# Patient Record
Sex: FEMALE | Race: OTHER | Employment: UNEMPLOYED | URBAN - METROPOLITAN AREA
[De-identification: names, ages, dates, MRNs, and addresses within clinical notes are randomized per-mention and may not be internally consistent; named-entity substitution may affect disease eponyms.]

---

## 2021-03-08 ENCOUNTER — TRANSCRIBE ORDER (OUTPATIENT)
Dept: SCHEDULING | Age: 21
End: 2021-03-08

## 2021-03-08 DIAGNOSIS — O12.00 EDEMA DURING PREGNANCY: Primary | ICD-10-CM

## 2021-03-09 ENCOUNTER — HOSPITAL ENCOUNTER (OUTPATIENT)
Dept: VASCULAR SURGERY | Age: 21
Discharge: HOME | End: 2021-03-09
Attending: STUDENT IN AN ORGANIZED HEALTH CARE EDUCATION/TRAINING PROGRAM
Payer: MEDICAID

## 2021-03-09 ENCOUNTER — HOSPITAL ENCOUNTER (OUTPATIENT)
Dept: LAB | Age: 21
Discharge: HOME | End: 2021-03-09

## 2021-03-09 DIAGNOSIS — O12.00 EDEMA DURING PREGNANCY: ICD-10-CM

## 2021-03-09 PROCEDURE — 93970 EXTREMITY STUDY: CPT

## 2021-05-26 ENCOUNTER — HOSPITAL ENCOUNTER (INPATIENT)
Age: 21
LOS: 3 days | Discharge: HOME | DRG: 560 | End: 2021-05-29
Attending: OBSTETRICS & GYNECOLOGY | Admitting: OBSTETRICS & GYNECOLOGY
Payer: MEDICAID

## 2021-05-26 PROBLEM — Z34.90 PREGNANCY: Status: ACTIVE | Noted: 2021-05-26

## 2021-05-26 LAB
ABO + RH BLD: NORMAL
APTT PPP: 26.1 SEC (ref 23–36.4)
BASOPHILS # BLD: 0 K/UL (ref 0–0.1)
BASOPHILS NFR BLD: 0 % (ref 0–2)
BLOOD GROUP ANTIBODIES SERPL: NORMAL
DIFFERENTIAL METHOD BLD: ABNORMAL
EOSINOPHIL # BLD: 0 K/UL (ref 0–0.4)
EOSINOPHIL NFR BLD: 0 % (ref 0–5)
ERYTHROCYTE [DISTWIDTH] IN BLOOD BY AUTOMATED COUNT: 13.8 % (ref 11.6–14.5)
HCT VFR BLD AUTO: 30.9 % (ref 35–45)
HGB BLD-MCNC: 9.7 G/DL (ref 12–16)
INR PPP: 1 (ref 0.8–1.2)
LYMPHOCYTES # BLD: 1.7 K/UL (ref 0.9–3.6)
LYMPHOCYTES NFR BLD: 24 % (ref 21–52)
MCH RBC QN AUTO: 27.8 PG (ref 24–34)
MCHC RBC AUTO-ENTMCNC: 31.4 G/DL (ref 31–37)
MCV RBC AUTO: 88.5 FL (ref 74–97)
MONOCYTES # BLD: 0.5 K/UL (ref 0.05–1.2)
MONOCYTES NFR BLD: 7 % (ref 3–10)
NEUTS SEG # BLD: 4.8 K/UL (ref 1.8–8)
NEUTS SEG NFR BLD: 68 % (ref 40–73)
PLATELET # BLD AUTO: 309 K/UL (ref 135–420)
PMV BLD AUTO: 10.9 FL (ref 9.2–11.8)
PROTHROMBIN TIME: 12.8 SEC (ref 11.5–15.2)
RBC # BLD AUTO: 3.49 M/UL (ref 4.2–5.3)
SPECIMEN EXP DATE BLD: NORMAL
WBC # BLD AUTO: 7.1 K/UL (ref 4.6–13.2)

## 2021-05-26 PROCEDURE — 65270000029 HC RM PRIVATE

## 2021-05-26 PROCEDURE — 74011250636 HC RX REV CODE- 250/636: Performed by: OBSTETRICS & GYNECOLOGY

## 2021-05-26 PROCEDURE — 85730 THROMBOPLASTIN TIME PARTIAL: CPT

## 2021-05-26 PROCEDURE — 36415 COLL VENOUS BLD VENIPUNCTURE: CPT

## 2021-05-26 PROCEDURE — 74011000250 HC RX REV CODE- 250: Performed by: OBSTETRICS & GYNECOLOGY

## 2021-05-26 PROCEDURE — 85025 COMPLETE CBC W/AUTO DIFF WBC: CPT

## 2021-05-26 PROCEDURE — 86901 BLOOD TYPING SEROLOGIC RH(D): CPT

## 2021-05-26 PROCEDURE — 85610 PROTHROMBIN TIME: CPT

## 2021-05-26 RX ORDER — OXYTOCIN/RINGER'S LACTATE 30/500 ML
10 PLASTIC BAG, INJECTION (ML) INTRAVENOUS AS NEEDED
Status: COMPLETED | OUTPATIENT
Start: 2021-05-26 | End: 2021-05-27

## 2021-05-26 RX ORDER — HYDROMORPHONE HYDROCHLORIDE 1 MG/ML
1 INJECTION, SOLUTION INTRAMUSCULAR; INTRAVENOUS; SUBCUTANEOUS
Status: DISCONTINUED | OUTPATIENT
Start: 2021-05-26 | End: 2021-05-27 | Stop reason: HOSPADM

## 2021-05-26 RX ORDER — MISOPROSTOL 100 UG/1
25 TABLET ORAL EVERY 4 HOURS
Status: DISCONTINUED | OUTPATIENT
Start: 2021-05-27 | End: 2021-05-26

## 2021-05-26 RX ORDER — NALBUPHINE HYDROCHLORIDE 10 MG/ML
10 INJECTION, SOLUTION INTRAMUSCULAR; INTRAVENOUS; SUBCUTANEOUS
Status: DISCONTINUED | OUTPATIENT
Start: 2021-05-26 | End: 2021-05-27 | Stop reason: HOSPADM

## 2021-05-26 RX ORDER — BUTORPHANOL TARTRATE 2 MG/ML
2 INJECTION INTRAMUSCULAR; INTRAVENOUS
Status: DISCONTINUED | OUTPATIENT
Start: 2021-05-26 | End: 2021-05-27 | Stop reason: HOSPADM

## 2021-05-26 RX ORDER — MISOPROSTOL 100 UG/1
25 TABLET ORAL EVERY 4 HOURS
Status: DISCONTINUED | OUTPATIENT
Start: 2021-05-26 | End: 2021-05-27

## 2021-05-26 RX ORDER — MINERAL OIL
30 OIL (ML) ORAL AS NEEDED
Status: COMPLETED | OUTPATIENT
Start: 2021-05-26 | End: 2021-05-27

## 2021-05-26 RX ORDER — TERBUTALINE SULFATE 1 MG/ML
0.25 INJECTION SUBCUTANEOUS
Status: DISCONTINUED | OUTPATIENT
Start: 2021-05-26 | End: 2021-05-27 | Stop reason: HOSPADM

## 2021-05-26 RX ORDER — LIDOCAINE HYDROCHLORIDE 10 MG/ML
20 INJECTION, SOLUTION EPIDURAL; INFILTRATION; INTRACAUDAL; PERINEURAL AS NEEDED
Status: DISCONTINUED | OUTPATIENT
Start: 2021-05-26 | End: 2021-05-27 | Stop reason: HOSPADM

## 2021-05-26 RX ORDER — METHYLERGONOVINE MALEATE 0.2 MG/ML
0.2 INJECTION INTRAVENOUS AS NEEDED
Status: COMPLETED | OUTPATIENT
Start: 2021-05-26 | End: 2021-05-27

## 2021-05-26 RX ORDER — SODIUM CHLORIDE, SODIUM LACTATE, POTASSIUM CHLORIDE, CALCIUM CHLORIDE 600; 310; 30; 20 MG/100ML; MG/100ML; MG/100ML; MG/100ML
125 INJECTION, SOLUTION INTRAVENOUS CONTINUOUS
Status: DISCONTINUED | OUTPATIENT
Start: 2021-05-26 | End: 2021-05-27 | Stop reason: HOSPADM

## 2021-05-26 RX ORDER — OXYTOCIN/0.9 % SODIUM CHLORIDE 30/500 ML
87.3 PLASTIC BAG, INJECTION (ML) INTRAVENOUS AS NEEDED
Status: COMPLETED | OUTPATIENT
Start: 2021-05-26 | End: 2021-05-27

## 2021-05-26 RX ORDER — ONDANSETRON 2 MG/ML
4 INJECTION INTRAMUSCULAR; INTRAVENOUS
Status: DISCONTINUED | OUTPATIENT
Start: 2021-05-26 | End: 2021-05-27 | Stop reason: HOSPADM

## 2021-05-26 RX ADMIN — MISOPROSTOL 25 MCG: 100 TABLET ORAL at 22:40

## 2021-05-26 RX ADMIN — SODIUM CHLORIDE, SODIUM LACTATE, POTASSIUM CHLORIDE, AND CALCIUM CHLORIDE 125 ML/HR: 600; 310; 30; 20 INJECTION, SOLUTION INTRAVENOUS at 21:21

## 2021-05-27 ENCOUNTER — ANESTHESIA (OUTPATIENT)
Dept: LABOR AND DELIVERY | Age: 21
DRG: 560 | End: 2021-05-27
Payer: MEDICAID

## 2021-05-27 ENCOUNTER — ANESTHESIA EVENT (OUTPATIENT)
Dept: LABOR AND DELIVERY | Age: 21
DRG: 560 | End: 2021-05-27
Payer: MEDICAID

## 2021-05-27 PROBLEM — Z3A.40 40 WEEKS GESTATION OF PREGNANCY: Status: ACTIVE | Noted: 2021-05-27

## 2021-05-27 PROBLEM — O41.03X1 OLIGOHYDRAMNIOS ANTEPARTUM, THIRD TRIMESTER, FETUS 1: Status: ACTIVE | Noted: 2021-05-27

## 2021-05-27 LAB
ALBUMIN SERPL-MCNC: 2.3 G/DL (ref 3.4–5)
ALBUMIN/GLOB SERPL: 0.7 {RATIO} (ref 0.8–1.7)
ALP SERPL-CCNC: 125 U/L (ref 45–117)
ALT SERPL-CCNC: 8 U/L (ref 13–56)
ANION GAP SERPL CALC-SCNC: 8 MMOL/L (ref 3–18)
AST SERPL-CCNC: 16 U/L (ref 10–38)
BILIRUB SERPL-MCNC: 0.4 MG/DL (ref 0.2–1)
BUN SERPL-MCNC: 6 MG/DL (ref 7–18)
BUN/CREAT SERPL: 12 (ref 12–20)
CALCIUM SERPL-MCNC: 8.3 MG/DL (ref 8.5–10.1)
CHLORIDE SERPL-SCNC: 108 MMOL/L (ref 100–111)
CO2 SERPL-SCNC: 22 MMOL/L (ref 21–32)
CREAT SERPL-MCNC: 0.52 MG/DL (ref 0.6–1.3)
GLOBULIN SER CALC-MCNC: 3.2 G/DL (ref 2–4)
GLUCOSE SERPL-MCNC: 89 MG/DL (ref 74–99)
POTASSIUM SERPL-SCNC: 4 MMOL/L (ref 3.5–5.5)
PROT SERPL-MCNC: 5.5 G/DL (ref 6.4–8.2)
SODIUM SERPL-SCNC: 138 MMOL/L (ref 136–145)

## 2021-05-27 PROCEDURE — 77030007879 HC KT SPN EPDRL TELE -B: Performed by: ANESTHESIOLOGY

## 2021-05-27 PROCEDURE — 77030010848 HC CATH INTUTR PRSS KOLB -B

## 2021-05-27 PROCEDURE — 77030019905 HC CATH URETH INTMIT MDII -A

## 2021-05-27 PROCEDURE — 74011250636 HC RX REV CODE- 250/636: Performed by: OBSTETRICS & GYNECOLOGY

## 2021-05-27 PROCEDURE — 74011000258 HC RX REV CODE- 258

## 2021-05-27 PROCEDURE — 74011250637 HC RX REV CODE- 250/637: Performed by: OBSTETRICS & GYNECOLOGY

## 2021-05-27 PROCEDURE — 74011000250 HC RX REV CODE- 250: Performed by: OBSTETRICS & GYNECOLOGY

## 2021-05-27 PROCEDURE — 36415 COLL VENOUS BLD VENIPUNCTURE: CPT

## 2021-05-27 PROCEDURE — 74011000250 HC RX REV CODE- 250: Performed by: ANESTHESIOLOGY

## 2021-05-27 PROCEDURE — 10H07YZ INSERTION OF OTHER DEVICE INTO PRODUCTS OF CONCEPTION, VIA NATURAL OR ARTIFICIAL OPENING: ICD-10-PCS | Performed by: OBSTETRICS & GYNECOLOGY

## 2021-05-27 PROCEDURE — 3E033VJ INTRODUCTION OF OTHER HORMONE INTO PERIPHERAL VEIN, PERCUTANEOUS APPROACH: ICD-10-PCS | Performed by: OBSTETRICS & GYNECOLOGY

## 2021-05-27 PROCEDURE — 77010026065 HC OXYGEN MINIMUM MEDICAL AIR

## 2021-05-27 PROCEDURE — 75410000002 HC LABOR FEE PER 1 HR

## 2021-05-27 PROCEDURE — 74011250636 HC RX REV CODE- 250/636

## 2021-05-27 PROCEDURE — 75410000000 HC DELIVERY VAGINAL/SINGLE

## 2021-05-27 PROCEDURE — 76060000078 HC EPIDURAL ANESTHESIA

## 2021-05-27 PROCEDURE — 65270000029 HC RM PRIVATE

## 2021-05-27 PROCEDURE — 74011250636 HC RX REV CODE- 250/636: Performed by: ANESTHESIOLOGY

## 2021-05-27 PROCEDURE — 0HQ9XZZ REPAIR PERINEUM SKIN, EXTERNAL APPROACH: ICD-10-PCS | Performed by: OBSTETRICS & GYNECOLOGY

## 2021-05-27 PROCEDURE — 80053 COMPREHEN METABOLIC PANEL: CPT

## 2021-05-27 PROCEDURE — 75410000003 HC RECOV DEL/VAG/CSECN EA 0.5 HR

## 2021-05-27 PROCEDURE — 00HU33Z INSERTION OF INFUSION DEVICE INTO SPINAL CANAL, PERCUTANEOUS APPROACH: ICD-10-PCS | Performed by: ANESTHESIOLOGY

## 2021-05-27 RX ORDER — FENTANYL CITRATE 50 UG/ML
INJECTION, SOLUTION INTRAMUSCULAR; INTRAVENOUS
Status: COMPLETED
Start: 2021-05-27 | End: 2021-05-27

## 2021-05-27 RX ORDER — SODIUM CHLORIDE 0.9 % (FLUSH) 0.9 %
5-40 SYRINGE (ML) INJECTION AS NEEDED
Status: DISCONTINUED | OUTPATIENT
Start: 2021-05-27 | End: 2021-05-27 | Stop reason: HOSPADM

## 2021-05-27 RX ORDER — ENOXAPARIN SODIUM 100 MG/ML
80 INJECTION SUBCUTANEOUS EVERY 12 HOURS
Status: DISCONTINUED | OUTPATIENT
Start: 2021-05-28 | End: 2021-05-29 | Stop reason: HOSPADM

## 2021-05-27 RX ORDER — OXYTOCIN/0.9 % SODIUM CHLORIDE 30/500 ML
0-20 PLASTIC BAG, INJECTION (ML) INTRAVENOUS
Status: DISCONTINUED | OUTPATIENT
Start: 2021-05-27 | End: 2021-05-29 | Stop reason: HOSPADM

## 2021-05-27 RX ORDER — LIDOCAINE HYDROCHLORIDE AND EPINEPHRINE 15; 5 MG/ML; UG/ML
INJECTION, SOLUTION EPIDURAL
Status: SHIPPED | OUTPATIENT
Start: 2021-05-27 | End: 2021-05-27

## 2021-05-27 RX ORDER — DIPHENHYDRAMINE HYDROCHLORIDE 50 MG/ML
25 INJECTION, SOLUTION INTRAMUSCULAR; INTRAVENOUS
Status: DISCONTINUED | OUTPATIENT
Start: 2021-05-27 | End: 2021-05-27 | Stop reason: HOSPADM

## 2021-05-27 RX ORDER — ROPIVACAINE HYDROCHLORIDE 2 MG/ML
INJECTION, SOLUTION EPIDURAL; INFILTRATION; PERINEURAL
Status: SHIPPED | OUTPATIENT
Start: 2021-05-27 | End: 2021-05-27

## 2021-05-27 RX ORDER — FENTANYL CITRATE 50 UG/ML
100 INJECTION, SOLUTION INTRAMUSCULAR; INTRAVENOUS ONCE
Status: DISCONTINUED | OUTPATIENT
Start: 2021-05-27 | End: 2021-05-27 | Stop reason: HOSPADM

## 2021-05-27 RX ORDER — NALOXONE HYDROCHLORIDE 0.4 MG/ML
0.2 INJECTION, SOLUTION INTRAMUSCULAR; INTRAVENOUS; SUBCUTANEOUS AS NEEDED
Status: DISCONTINUED | OUTPATIENT
Start: 2021-05-27 | End: 2021-05-27 | Stop reason: HOSPADM

## 2021-05-27 RX ORDER — PHENYLEPHRINE HCL IN 0.9% NACL 1 MG/10 ML
80 SYRINGE (ML) INTRAVENOUS AS NEEDED
Status: DISCONTINUED | OUTPATIENT
Start: 2021-05-27 | End: 2021-05-27 | Stop reason: HOSPADM

## 2021-05-27 RX ORDER — FENTANYL/ROPIVACAINE/NS/PF 2MCG/ML-.1
PLASTIC BAG, INJECTION (ML) EPIDURAL
Status: COMPLETED
Start: 2021-05-27 | End: 2021-05-27

## 2021-05-27 RX ORDER — SODIUM CHLORIDE 0.9 % (FLUSH) 0.9 %
5-40 SYRINGE (ML) INJECTION EVERY 8 HOURS
Status: DISCONTINUED | OUTPATIENT
Start: 2021-05-27 | End: 2021-05-27 | Stop reason: HOSPADM

## 2021-05-27 RX ORDER — FENTANYL/ROPIVACAINE/NS/PF 2MCG/ML-.1
1-15 PLASTIC BAG, INJECTION (ML) EPIDURAL
Status: DISCONTINUED | OUTPATIENT
Start: 2021-05-27 | End: 2021-05-27 | Stop reason: HOSPADM

## 2021-05-27 RX ORDER — FENTANYL CITRATE 50 UG/ML
INJECTION, SOLUTION INTRAMUSCULAR; INTRAVENOUS AS NEEDED
Status: DISCONTINUED | OUTPATIENT
Start: 2021-05-27 | End: 2021-05-27 | Stop reason: HOSPADM

## 2021-05-27 RX ADMIN — SODIUM CHLORIDE, SODIUM LACTATE, POTASSIUM CHLORIDE, AND CALCIUM CHLORIDE 500 ML: 600; 310; 30; 20 INJECTION, SOLUTION INTRAVENOUS at 08:02

## 2021-05-27 RX ADMIN — BUTORPHANOL TARTRATE 2 MG: 2 INJECTION, SOLUTION INTRAMUSCULAR; INTRAVENOUS at 09:16

## 2021-05-27 RX ADMIN — SODIUM CHLORIDE, SODIUM LACTATE, POTASSIUM CHLORIDE, AND CALCIUM CHLORIDE 125 ML/HR: 600; 310; 30; 20 INJECTION, SOLUTION INTRAVENOUS at 11:37

## 2021-05-27 RX ADMIN — FENTANYL CITRATE 100 MCG: 50 INJECTION, SOLUTION INTRAMUSCULAR; INTRAVENOUS at 15:51

## 2021-05-27 RX ADMIN — SODIUM CHLORIDE, SODIUM LACTATE, POTASSIUM CHLORIDE, AND CALCIUM CHLORIDE 1000 ML: 600; 310; 30; 20 INJECTION, SOLUTION INTRAVENOUS at 14:45

## 2021-05-27 RX ADMIN — ROPIVACAINE HYDROCHLORIDE 20 MG: 2 INJECTION EPIDURAL; INFILTRATION; PERINEURAL at 15:46

## 2021-05-27 RX ADMIN — Medication 12 ML/HR: at 19:10

## 2021-05-27 RX ADMIN — METHYLERGONOVINE MALEATE 0.2 MG: 0.2 INJECTION, SOLUTION INTRAMUSCULAR; INTRAVENOUS at 19:56

## 2021-05-27 RX ADMIN — ONDANSETRON 4 MG: 2 INJECTION INTRAMUSCULAR; INTRAVENOUS at 15:18

## 2021-05-27 RX ADMIN — MINERAL OIL 30 ML: 1000 SOLUTION ORAL at 19:20

## 2021-05-27 RX ADMIN — MISOPROSTOL 25 MCG: 100 TABLET ORAL at 04:58

## 2021-05-27 RX ADMIN — FENTANYL CITRATE 12 ML/HR: 0.05 INJECTION, SOLUTION INTRAMUSCULAR; INTRAVENOUS at 15:54

## 2021-05-27 RX ADMIN — Medication 12 ML/HR: at 15:54

## 2021-05-27 RX ADMIN — BUTORPHANOL TARTRATE 2 MG: 2 INJECTION, SOLUTION INTRAMUSCULAR; INTRAVENOUS at 06:46

## 2021-05-27 RX ADMIN — SODIUM CHLORIDE, SODIUM LACTATE, POTASSIUM CHLORIDE, AND CALCIUM CHLORIDE 125 ML/HR: 600; 310; 30; 20 INJECTION, SOLUTION INTRAVENOUS at 15:35

## 2021-05-27 RX ADMIN — Medication 6 MILLI-UNITS/MIN: at 08:34

## 2021-05-27 RX ADMIN — LIDOCAINE HYDROCHLORIDE,EPINEPHRINE BITARTRATE 5 ML: 15; .005 INJECTION, SOLUTION EPIDURAL; INFILTRATION; INTRACAUDAL; PERINEURAL at 15:46

## 2021-05-27 RX ADMIN — ONDANSETRON 4 MG: 2 INJECTION INTRAMUSCULAR; INTRAVENOUS at 07:26

## 2021-05-27 RX ADMIN — Medication 87.3 MILLI-UNITS/MIN: at 19:56

## 2021-05-27 RX ADMIN — Medication 10000 MILLI-UNITS: at 19:36

## 2021-05-27 NOTE — L&D DELIVERY NOTE
Vaginal Delivery Procedure Note    Name: Allyson Casillas   Medical Record Number: [id number]   YOB: 2000  Today's Date: May 27, 2021        Procedure: VAGINAL DELIVERY without suction or forceps       Anesthesia:  epidural    Extra Procedure Details:  Delay L shoulder under symphysis. Resolved with posterior shoulder (R) rotated to anterior and delivered. Delay < 30 seconds     Estimated Blood Loss: 400cc  Fetal Description: matute male     Anterior shoulder: left, right roatated from posterior to anterior and delivered    Umbilical Cord: 3 vessels present    Episiotomy: no   Tear: yesType: R labial and L labial , both superficial.  R lateral and superficial  Degree: 1st degree   Repair:  2/0cc, 1 suture to each labia and 1 suture to the lateral tear              Cord Blood Results:   Information for the patient's :  Gilda Quigley [[id number]]   @ABG@       Birth Information:   Information for the patient's :  Gilda Quigley [[id number]]           Apgars 7,8 at 1 and 5 min respectively    Blood gases sent ph 7.2, art.  7.3 antonio. Specimens: Placenta was not sent     Placenta:    Spontaneous with assist and apparently intact on exam          Complications:  Baby retractions post del, Rod called. Birth Weight: 7lb 2oz    Mother's Condition: good  Baby's Condition: good  Sponge and needle count:    correct    I was present for the delivery.  Delivered for  Our  practice  Signed: Olga Lidia Rosado MD      May 27, 2021

## 2021-05-27 NOTE — ANESTHESIA PREPROCEDURE EVALUATION
Relevant Problems   No relevant active problems       Anesthetic History   No history of anesthetic complications            Review of Systems / Medical History  Patient summary reviewed, nursing notes reviewed and pertinent labs reviewed    Pulmonary  Within defined limits                 Neuro/Psych         Headaches     Cardiovascular                    Comments: DVT- last took lovenox 3 days ago   GI/Hepatic/Renal  Within defined limits              Endo/Other        Anemia     Other Findings            Physical Exam    Airway  Mallampati: II  TM Distance: 4 - 6 cm  Neck ROM: normal range of motion   Mouth opening: Normal     Cardiovascular               Dental  No notable dental hx       Pulmonary                 Abdominal         Other Findings            Anesthetic Plan    ASA: 2  Anesthesia type: epidural            Anesthetic plan and risks discussed with: Patient      Discussed risks of epidural including but not limited to: infection, bleeding, nerve injury, hypotension, post dural puncture headache, back pain, and block failure. Patient wishes to proceed.

## 2021-05-27 NOTE — PROGRESS NOTES
Care plan note: Patient progressing in labor. Afebrile. Reports comfort with epidural. No reported nausea.        Problem: Pain  Goal: *Control of Pain  Outcome: Progressing Towards Goal     Problem: Patient Education: Go to Patient Education Activity  Goal: Patient/Family Education  Outcome: Progressing Towards Goal     Problem: Patient Education: Go to Patient Education Activity  Goal: Patient/Family Education  Outcome: Progressing Towards Goal     Problem: Vaginal Delivery: Day of Deliver-Laboring  Goal: Off Pathway (Use only if patient is Off Pathway)  Outcome: Progressing Towards Goal  Goal: Activity/Safety  Outcome: Progressing Towards Goal  Goal: Consults, if ordered  Outcome: Progressing Towards Goal  Goal: Diagnostic Test/Procedures  Outcome: Progressing Towards Goal  Goal: Nutrition/Diet  Outcome: Progressing Towards Goal  Goal: Discharge Planning  Outcome: Progressing Towards Goal  Goal: Medications  Outcome: Progressing Towards Goal  Goal: Respiratory  Outcome: Progressing Towards Goal  Goal: Treatments/Interventions/Procedures  Outcome: Progressing Towards Goal  Goal: *Vital signs within defined limits  Outcome: Progressing Towards Goal  Goal: *Labs within defined limits  Outcome: Progressing Towards Goal  Goal: *Hemodynamically stable  Outcome: Progressing Towards Goal  Goal: *Optimal pain control at patient's stated goal  Outcome: Progressing Towards Goal

## 2021-05-27 NOTE — PROGRESS NOTES
2100: Pt is a 23 y.o.  at 40w0d, arrived to L&D triagefor scheduled induction for oligo. EFM and TOCO applied, call bell within reach. SBAR to MD Junius Hammans, orders received.     5: SBAR update to MD Shaver, aware of SROM and SVE

## 2021-05-27 NOTE — PROGRESS NOTES
Slow to increase pit because of worry re tachysystole. I think these conractions are mild to moderate. Placing IUPC may enable better and safer use of pit. Discussed with patient and she is in agreement. Monitor:  Reactivity:present Variability:present Baseline:within normal limits  Contractions q 4    Vitals:  Blood pressure (!) 117/53, pulse 62, temperature 97.7 °F (36.5 °C), resp. rate 13, height 5' 4\" (1.626 m), weight 79.8 kg (176 lb), SpO2 99 %.      Pelvic exam:  Cervix 3   Effaced: 80%Station: 0        Plan:     IUPC, continue pit      Signed By: Temo Chavarria MD                         May 27, 2021

## 2021-05-27 NOTE — H&P
Ostetrical History and Physical    Subjective:     Date of Admission: 2021    Patient is a 23 y.o.   female admitted with term preg for induction because of oligo. Started with cytotec, SROM at 1am.  Cytotec has had no obvious effect. At 8am start pit    For Obstetric history, see prenantal.    For Review of Systems, see prenatal    Past Medical History:   Diagnosis Date    Anemia     DVT (deep vein thrombosis) in pregnancy     Migraine       History reviewed. No pertinent surgical history. Prior to Admission medications    Medication Sig Start Date End Date Taking? Authorizing Provider   PNV Comb #2-Iron-FA-Omega 3 29-1-400 mg cmpk Take 1 Tablet by mouth. Yes Provider, Historical   heparin sodium,porcine (HEPARIN, PORCINE,) 10,000 Units by Does Not Apply route two (2) times a day. Yes Provider, Historical     No Known Allergies   Social History     Tobacco Use    Smoking status: Never Smoker    Smokeless tobacco: Never Used   Substance Use Topics    Alcohol use: Not Currently      History reviewed. No pertinent family history. Objective:     Blood pressure 110/70, pulse 70, temperature 98.1 °F (36.7 °C), resp. rate 18, height 5' 4\" (1.626 m), weight 79.8 kg (176 lb). Temp (24hrs), Av.1 °F (36.7 °C), Min:98 °F (36.7 °C), Max:98.1 °F (36.7 °C)        No intake/output data recorded. No intake/output data recorded. HEENT: No pallor, no jaundice, Thyroid and throat normal  RESPIRATORY: Clear to A & P  CVS: pulse reg, HS normal  ABDOMEN: Gravid. Vertex. EFW=7lb +-1lb. No abnormal tenderness.    Pelvic: Cervix 1, (by RN) ROM confirmed by RN   Data Review:   Recent Results (from the past 24 hour(s))   CBC WITH AUTOMATED DIFF    Collection Time: 21  9:20 PM   Result Value Ref Range    WBC 7.1 4.6 - 13.2 K/uL    RBC 3.49 (L) 4.20 - 5.30 M/uL    HGB 9.7 (L) 12.0 - 16.0 g/dL    HCT 30.9 (L) 35.0 - 45.0 %    MCV 88.5 74.0 - 97.0 FL    MCH 27.8 24.0 - 34.0 PG    MCHC 31.4 31.0 - 37.0 g/dL    RDW 13.8 11.6 - 14.5 %    PLATELET 388 241 - 074 K/uL    MPV 10.9 9.2 - 11.8 FL    NEUTROPHILS 68 40 - 73 %    LYMPHOCYTES 24 21 - 52 %    MONOCYTES 7 3 - 10 %    EOSINOPHILS 0 0 - 5 %    BASOPHILS 0 0 - 2 %    ABS. NEUTROPHILS 4.8 1.8 - 8.0 K/UL    ABS. LYMPHOCYTES 1.7 0.9 - 3.6 K/UL    ABS. MONOCYTES 0.5 0.05 - 1.2 K/UL    ABS. EOSINOPHILS 0.0 0.0 - 0.4 K/UL    ABS. BASOPHILS 0.0 0.0 - 0.1 K/UL    DF AUTOMATED     TYPE & SCREEN    Collection Time: 05/26/21  9:20 PM   Result Value Ref Range    Crossmatch Expiration 05/29/2021,235     ABO/Rh(D) A POSITIVE     Antibody screen NEG    PTT    Collection Time: 05/26/21  9:20 PM   Result Value Ref Range    aPTT 26.1 23.0 - 36.4 SEC   PROTHROMBIN TIME + INR    Collection Time: 05/26/21  9:20 PM   Result Value Ref Range    Prothrombin time 12.8 11.5 - 15.2 sec    INR 1.0 0.8 - 1.2       Monitor:  Reactivity:present Variability:present Baseline:within normal limits    Assessment:     Active Problems:    Pregnancy (5/26/2021)      40 weeks gestation of pregnancy (5/27/2021)      Oligohydramnios antepartum, third trimester, fetus 1 (5/27/2021)        Plan:     Cytotec has had no obvious effect. At 8am start pit    Check labs:  CBC  Check  Prenatal:    Disposition:     Type of admit:In-Patient    I saw and examined patient.     Signed By: Paolo Michael MD                         May 27, 2021

## 2021-05-27 NOTE — PROGRESS NOTES
Problem: Pain  Goal: *Control of Pain  Outcome: Progressing Towards Goal     Problem: Patient Education: Go to Patient Education Activity  Goal: Patient/Family Education  Outcome: Progressing Towards Goal     Problem: Patient Education: Go to Patient Education Activity  Goal: Patient/Family Education  Outcome: Progressing Towards Goal     Problem: Vaginal Delivery: Day of Deliver-Laboring  Goal: Off Pathway (Use only if patient is Off Pathway)  Outcome: Progressing Towards Goal  Goal: Activity/Safety  Outcome: Progressing Towards Goal  Goal: Consults, if ordered  Outcome: Progressing Towards Goal  Goal: Diagnostic Test/Procedures  Outcome: Progressing Towards Goal  Goal: Nutrition/Diet  Outcome: Progressing Towards Goal  Goal: Discharge Planning  Outcome: Progressing Towards Goal  Goal: Medications  Outcome: Progressing Towards Goal  Goal: Respiratory  Outcome: Progressing Towards Goal  Goal: Treatments/Interventions/Procedures  Outcome: Progressing Towards Goal  Goal: *Vital signs within defined limits  Outcome: Progressing Towards Goal  Goal: *Labs within defined limits  Outcome: Progressing Towards Goal  Goal: *Hemodynamically stable  Outcome: Progressing Towards Goal  Goal: *Optimal pain control at patient's stated goal  Outcome: Progressing Towards Goal     Problem: Pain  Goal: *Control of Pain  Outcome: Progressing Towards Goal     Problem: Vaginal Delivery: Day of Deliver-Laboring  Goal: Off Pathway (Use only if patient is Off Pathway)  Outcome: Progressing Towards Goal  Goal: Activity/Safety  Outcome: Progressing Towards Goal  Goal: Consults, if ordered  Outcome: Progressing Towards Goal  Goal: Diagnostic Test/Procedures  Outcome: Progressing Towards Goal  Goal: Nutrition/Diet  Outcome: Progressing Towards Goal  Goal: Discharge Planning  Outcome: Progressing Towards Goal  Goal: Medications  Outcome: Progressing Towards Goal  Goal: Respiratory  Outcome: Progressing Towards Goal  Goal: Treatments/Interventions/Procedures  Outcome: Progressing Towards Goal  Goal: *Vital signs within defined limits  Outcome: Progressing Towards Goal  Goal: *Labs within defined limits  Outcome: Progressing Towards Goal  Goal: *Hemodynamically stable  Outcome: Progressing Towards Goal  Goal: *Optimal pain control at patient's stated goal  Outcome: Progressing Towards Goal

## 2021-05-27 NOTE — ANESTHESIA PROCEDURE NOTES
Epidural Block    Start time: 5/27/2021 3:46 PM  End time: 5/27/2021 3:51 PM  Reason for block: labor epidural  Staffing  Performed: attending   Anesthesiologist: Sarita Garcia MD  Preanesthetic Checklist  Completed: patient identified, IV checked, site marked, risks and benefits discussed, surgical consent, monitors and equipment checked, pre-op evaluation and timeout performed  Block Placement  Patient position: sitting  Prep: ChloraPrep  Sterility prep: mask, hand, gloves, drape and cap  Sedation level: no sedation  Patient monitoring: frequent blood pressure checks, heart rate and continuous pulse oximetry  Approach: midline  Location: lumbar  Lumbar location: L3-L4  Epidural  Loss of resistance technique: saline  Guidance: landmark technique  Needle  Needle type: Tuohy   Needle gauge: 19 G  Needle length: 9 cm  Needle insertion depth: 5 cm  Catheter type: multi-orifice  Catheter size: 19 G  Catheter at skin depth: 10 cm  Catheter securement method: clear occlusive dressing and surgical tape  Test dose: negative  Medications Administered  Lidocaine-EPINEPHrine (XYLOCAINE) 1.5 %-1:200,000 Epidural, 5 mL  ropivacaine (NAROPIN) 2 mg/mL (0.2 %) injection, 20 mg  Assessment  Block outcome: pain improved  Number of attempts: 1  Procedure assessment: patient tolerated procedure well with no immediate complications

## 2021-05-27 NOTE — PROGRESS NOTES
Intermittent decelsalays with good recovery    Monitor:  Reactivity:present Variability:present Baseline:within normal limits  Contractions q 3    Vitals:  Blood pressure (!) 96/50, pulse 66, temperature 98.1 °F (36.7 °C), resp. rate 20, height 5' 4\" (1.626 m), weight 79.8 kg (176 lb), SpO2 100 %.      Pelvic exam:  Cervix 7   Effaced: 90%Station: 0   caput at +1    Plan:     Continue pit, reduce at  present      Signed By: Jacky Blakely MD                         May 27, 2021

## 2021-05-28 LAB
HCT VFR BLD AUTO: 28.8 % (ref 35–45)
HGB BLD-MCNC: 9 G/DL (ref 12–16)

## 2021-05-28 PROCEDURE — 36415 COLL VENOUS BLD VENIPUNCTURE: CPT

## 2021-05-28 PROCEDURE — 74011250636 HC RX REV CODE- 250/636: Performed by: OBSTETRICS & GYNECOLOGY

## 2021-05-28 PROCEDURE — 65270000029 HC RM PRIVATE

## 2021-05-28 PROCEDURE — 85018 HEMOGLOBIN: CPT

## 2021-05-28 PROCEDURE — 74011250637 HC RX REV CODE- 250/637: Performed by: OBSTETRICS & GYNECOLOGY

## 2021-05-28 RX ORDER — ACETAMINOPHEN 325 MG/1
650 TABLET ORAL
Status: DISCONTINUED | OUTPATIENT
Start: 2021-05-28 | End: 2021-05-29 | Stop reason: HOSPADM

## 2021-05-28 RX ORDER — PROMETHAZINE HYDROCHLORIDE 25 MG/ML
25 INJECTION, SOLUTION INTRAMUSCULAR; INTRAVENOUS
Status: DISCONTINUED | OUTPATIENT
Start: 2021-05-28 | End: 2021-05-29 | Stop reason: HOSPADM

## 2021-05-28 RX ORDER — AMOXICILLIN 250 MG
1 CAPSULE ORAL
Status: DISCONTINUED | OUTPATIENT
Start: 2021-05-28 | End: 2021-05-29 | Stop reason: HOSPADM

## 2021-05-28 RX ORDER — ZOLPIDEM TARTRATE 5 MG/1
5 TABLET ORAL
Status: DISCONTINUED | OUTPATIENT
Start: 2021-05-28 | End: 2021-05-29 | Stop reason: HOSPADM

## 2021-05-28 RX ORDER — IBUPROFEN 400 MG/1
800 TABLET ORAL
Status: DISCONTINUED | OUTPATIENT
Start: 2021-05-28 | End: 2021-05-29 | Stop reason: HOSPADM

## 2021-05-28 RX ORDER — OXYCODONE AND ACETAMINOPHEN 5; 325 MG/1; MG/1
2 TABLET ORAL
Status: DISCONTINUED | OUTPATIENT
Start: 2021-05-28 | End: 2021-05-29 | Stop reason: HOSPADM

## 2021-05-28 RX ADMIN — ENOXAPARIN SODIUM 80 MG: 80 INJECTION SUBCUTANEOUS at 02:02

## 2021-05-28 RX ADMIN — IBUPROFEN 800 MG: 400 TABLET, FILM COATED ORAL at 22:50

## 2021-05-28 RX ADMIN — IBUPROFEN 800 MG: 400 TABLET, FILM COATED ORAL at 14:27

## 2021-05-28 RX ADMIN — ACETAMINOPHEN 650 MG: 325 TABLET ORAL at 12:36

## 2021-05-28 RX ADMIN — ENOXAPARIN SODIUM 80 MG: 80 INJECTION SUBCUTANEOUS at 14:12

## 2021-05-28 RX ADMIN — IBUPROFEN 800 MG: 400 TABLET, FILM COATED ORAL at 06:27

## 2021-05-28 NOTE — ROUTINE PROCESS
191 Verbal bedside report received, assumed care of patient awake and alert. EFM/IUPC in progress. Epidural anesthesia in progress with patient reporting no pain, only pressure during contractions. IV infusing without difficulty.  Legs up in stirrups to begin pushing. Dr. Shella Barthel at bedside for delivery.    Per Dr. Shella Barthel of viable male with spontaneous cry placed on moms chest for skin to skin.  Spontaneous delivery of intact placenta per Dr. Shella Barthel. Pitocin infusion initiated at bolus rate.  Laceration repair complete per MD. All counts correct, begin recovery.  Epidural catheter removed with blue tip intact. Up to BR with minimal assistance. Voided 600 ml without difficulty. Pericare done per patient.  Transferred per W/C to NICU to visit with infant.  TRANSFER - OUT REPORT:    Verbal report given to LAZARA Haynes RN(name) on [employer]  being transferred to mother baby(unit) for routine progression of care       Report consisted of patients Situation, Background, Assessment and   Recommendations(SBAR). Information from the following report(s) SBAR, Kardex, Procedure Summary, Intake/Output, MAR, Recent Results and Med Rec Status was reviewed with the receiving nurse. Lines:   Peripheral IV 21 Posterior;Right Forearm (Active)        Opportunity for questions and clarification was provided.       Patient transported with:   Registered Nurse

## 2021-05-28 NOTE — PROGRESS NOTES
2218 TRANSFER - IN REPORT:    Verbal report received from MARIELOS Baker(name) on [employer]  being received from L&D(unit) for routine progression of care      Report consisted of patients Situation, Background, Assessment and   Recommendations(SBAR). Information from the following report(s) SBAR, Kardex, Intake/Output, MAR and Recent Results was reviewed with the receiving nurse. Opportunity for questions and clarification was provided. 2255 shift assessment    0720 Bedside and Verbal shift change report given to NY Garnett RN (oncoming nurse) by Tomasa Zuniga RN (offgoing nurse). Report included the following information SBAR, Kardex, Procedure Summary, Intake/Output and Recent Results.

## 2021-05-28 NOTE — PROGRESS NOTES
0720 Bedside and Verbal shift change report given to NY Roberts RN (oncoming nurse) by Arash Heredia RN (offgoing nurse). Report included the following information SBAR, Kardex, Procedure Summary, Intake/Output, MAR and Recent Results. 0825 Shift assessment complete. Patient has no needs or concerns at this time    36 [employer] HUGs tag to be changed, patient has no needs or concerns at this time    1046 informed pt  would like to be seen by MELISSA, pt verbalized understanding. 1115 Patient informed of  status, going to nurse. 1236 2 tab tylenol given to patient per request    1325 Pain reassessed. 1348 Patent ambulating to nursery for  bath    1412 Lovenox given    1416 Ambulating to room    1427 2 tab motrin given    1510 Reassessment complete and pain reassessed    1625 Patient has no needs or concerns at this time    1808 Patient has no needs or concerns at this time    1910 Bedside and Verbal shift change report given to MICAH Vidal RN (oncoming nurse) by Barbara Arnold. Vinny Roberts RN (offgoing nurse). Report included the following information SBAR, Kardex, Procedure Summary, Intake/Output, MAR and Recent Results.

## 2021-05-28 NOTE — LACTATION NOTE
0 Mom educated on breastfeeding basics--hunger cues, feeding on demand, waking baby if baby sleeps too long between feeds, importance of skin to skin, positioning and latching, risk of pacifier use and supplemental feedings, and importance of rooming in--and use of log sheet. Mom also educated on benefits of breastfeeding for herself and baby. Mom verbalized understanding. No questions at this time. 2350 Attempted to get  latched. Grants very spitty and sleepy at this time. Mom was able to express several drops of colostrum into newborns mouth. Encouraged to keep  skin to skin until feeding. Mom verbalized understanding.

## 2021-05-28 NOTE — ANESTHESIA POSTPROCEDURE EVALUATION
5/28/2021  7:52 PM    Laboring Epidural Follow-up Note     Referring physician: Prasanna North DO   Patient status post vaginal delivery with labor epidural    Visit Vitals  BP (!) 106/58 (BP 1 Location: Left arm, BP Patient Position: At rest)   Pulse 67   Temp 36.9 °C (98.5 °F)   Resp 17   Ht 5' 4\" (1.626 m)   Wt 79.8 kg (176 lb)   SpO2 98%   Breastfeeding Unknown   BMI 30.21 kg/m²       Epidural removed by L&D staff  No sedation, pruritis noted. Adequate analgesia. No obvious anesthesia complications          Sara A May-Such, CRNA  * No procedures listed *.    epidural    <BSHSIANPOST>    INITIAL Post-op Vital signs: No vitals data found for the desired time range.

## 2021-05-28 NOTE — PROGRESS NOTES
Progress Note    Patient: Hong Robison MRN: [id number]  SSN: [id number]    YOB: 2000  Age: 23 y.o. Sex: female      Subjective:     Postpartum Day: 1     Delivery: vaginal delivery    The patient feels well. The patient denies emotional concerns. The baby iswell. Baby is feeding via breast.  The patient is ambulating well. The patient  tolerating a normal diet. Flatus has been passed. Objective:      No data found. LABS: Recent Results (from the past 24 hour(s))   METABOLIC PANEL, COMPREHENSIVE    Collection Time: 05/27/21 10:43 PM   Result Value Ref Range    Sodium 138 136 - 145 mmol/L    Potassium 4.0 3.5 - 5.5 mmol/L    Chloride 108 100 - 111 mmol/L    CO2 22 21 - 32 mmol/L    Anion gap 8 3.0 - 18 mmol/L    Glucose 89 74 - 99 mg/dL    BUN 6 (L) 7.0 - 18 MG/DL    Creatinine 0.52 (L) 0.6 - 1.3 MG/DL    BUN/Creatinine ratio 12 12 - 20      GFR est AA >60 >60 ml/min/1.73m2    GFR est non-AA >60 >60 ml/min/1.73m2    Calcium 8.3 (L) 8.5 - 10.1 MG/DL    Bilirubin, total 0.4 0.2 - 1.0 MG/DL    ALT (SGPT) 8 (L) 13 - 56 U/L    AST (SGOT) 16 10 - 38 U/L    Alk. phosphatase 125 (H) 45 - 117 U/L    Protein, total 5.5 (L) 6.4 - 8.2 g/dL    Albumin 2.3 (L) 3.4 - 5.0 g/dL    Globulin 3.2 2.0 - 4.0 g/dL    A-G Ratio 0.7 (L) 0.8 - 1.7     HGB & HCT    Collection Time: 05/28/21  6:04 AM   Result Value Ref Range    HGB 9.0 (L) 12.0 - 16.0 g/dL    HCT 28.8 (L) 35.0 - 45.0 %          Lochia:  appropriate   Uterine Fundus:   firm   Fundus Location:  -1   Incision:  no significant drainage   DVT Evaluation:  No evidence of DVT seen on physical exam.     Lab/Data Review: All lab results for the last 24 hours reviewed. Assessment:     Status post: Doing well postpartum vaginal delivery     Plan:     Postpartum care discussed including diet, ambulation, and actvitiy restrictions. Discharge instructions and questions answered for vaginal delivery.     Signed By: Akila Moore MD     May 28, 2021

## 2021-05-28 NOTE — PROGRESS NOTES
Problem: Pain  Goal: *Control of Pain  Outcome: Progressing Towards Goal     Problem: Patient Education: Go to Patient Education Activity  Goal: Patient/Family Education  Outcome: Progressing Towards Goal     Problem: Patient Education: Go to Patient Education Activity  Goal: Patient/Family Education  Outcome: Progressing Towards Goal     Problem: Vaginal Delivery: Postpartum Day 1  Goal: Activity/Safety  Outcome: Progressing Towards Goal  Goal: Nutrition/Diet  Outcome: Progressing Towards Goal  Goal: Discharge Planning  Outcome: Progressing Towards Goal  Goal: Medications  Outcome: Progressing Towards Goal  Goal: Psychosocial  Outcome: Progressing Towards Goal  Goal: *Vital signs within defined limits  Outcome: Progressing Towards Goal  Goal: *Labs within defined limits  Outcome: Progressing Towards Goal  Goal: *Hemodynamically stable  Outcome: Progressing Towards Goal  Goal: *Optimal pain control at patient's stated goal  Outcome: Progressing Towards Goal  Goal: *Participates in infant care  Outcome: Progressing Towards Goal  Goal: *Demonstrates progressive activity  Outcome: Progressing Towards Goal  Goal: *Performs self perineal care  Outcome: Progressing Towards Goal  Goal: *Appropriate parent-infant bonding  Outcome: Progressing Towards Goal  Goal: *Tolerating diet  Outcome: Progressing Towards Goal  Goal: *Performs self breast care  Outcome: Progressing Towards Goal

## 2021-05-28 NOTE — LACTATION NOTE
This note was copied from a baby's chart. 80 per mom, infant latching and nursing well. No questions at this time. Will remain available.

## 2021-05-28 NOTE — LACTATION NOTE
Infant latched and nursing well. DOL expectations discussed. 0 Mom concerned about infant nursing often and sucking on hands. Discussed nutritive vs non nutritive and cluster feeds. Discussed laxative properties of colostrum as well. Mom verbalized understanding and no questions at this time.

## 2021-05-29 VITALS
DIASTOLIC BLOOD PRESSURE: 69 MMHG | SYSTOLIC BLOOD PRESSURE: 114 MMHG | BODY MASS INDEX: 30.05 KG/M2 | HEART RATE: 56 BPM | TEMPERATURE: 97.7 F | OXYGEN SATURATION: 98 % | RESPIRATION RATE: 16 BRPM | HEIGHT: 64 IN | WEIGHT: 176 LBS

## 2021-05-29 PROCEDURE — 74011250637 HC RX REV CODE- 250/637: Performed by: OBSTETRICS & GYNECOLOGY

## 2021-05-29 PROCEDURE — 74011250636 HC RX REV CODE- 250/636: Performed by: OBSTETRICS & GYNECOLOGY

## 2021-05-29 RX ADMIN — ENOXAPARIN SODIUM 80 MG: 80 INJECTION SUBCUTANEOUS at 14:32

## 2021-05-29 RX ADMIN — ENOXAPARIN SODIUM 80 MG: 80 INJECTION SUBCUTANEOUS at 03:26

## 2021-05-29 RX ADMIN — IBUPROFEN 800 MG: 400 TABLET, FILM COATED ORAL at 08:06

## 2021-05-29 NOTE — ROUTINE PROCESS
Bedside and Verbal shift change report given to GLENDY Nagel RN (oncoming nurse) by MICAH Gipson RN (offgoing nurse). Report included the following information SBAR, Kardex, Intake/Output, MAR and Recent Results.

## 2021-05-29 NOTE — PROGRESS NOTES
Problem: Vaginal Delivery: Postpartum 2  Goal: Activity/Safety  Outcome: Progressing Towards Goal  Goal: Nutrition/Diet  Outcome: Progressing Towards Goal  Goal: Discharge Planning  Outcome: Progressing Towards Goal  Goal: Psychosocial  Outcome: Progressing Towards Goal     Problem: Pain  Goal: *Control of Pain  Outcome: Progressing Towards Goal

## 2021-05-29 NOTE — PROGRESS NOTES
Progress Note    Patient: Cristian Lezama MRN: [id number]  SSN: [id number]    YOB: 2000  Age: 23 y.o. Sex: female      Subjective:     Postpartum Day: 2     Delivery: vaginal delivery    The patient feels well. The patient denies emotional concerns. The baby iswell. Baby is feeding via breast.  The patient is ambulating well. The patient  tolerating a normal diet. Flatus has been passed. Objective:      Patient Vitals for the past 8 hrs:   BP Temp Pulse Resp SpO2   05/29/21 0805 114/69 97.7 °F (36.5 °C) (!) 56 16 98 %     LABS: No results found for this or any previous visit (from the past 24 hour(s)). Lochia:  appropriate   Uterine Fundus:   firm   Fundus Location:  -1   Incision:  no significant drainage   DVT Evaluation:  No evidence of DVT seen on physical exam.     Lab/Data Review: All lab results for the last 24 hours reviewed. Assessment:     Status post: Doing well postpartum vaginal delivery     Plan:     Postpartum care discussed including diet, ambulation, and actvitiy restrictions. Discharge instructions and questions answered for vaginal delivery.     Signed By: Kim Herrera MD     May 29, 2021

## 2021-05-29 NOTE — DISCHARGE INSTRUCTIONS
POST DELIVERY DISCHARGE INSTRUCTIONS    Name: Julia Martinez  YOB: 2000  Primary Diagnosis: Active Problems:    Pregnancy (5/26/2021)      40 weeks gestation of pregnancy (5/27/2021)      Oligohydramnios antepartum, third trimester, fetus 1 (5/27/2021)      General:     Diet/Diet Restrictions:  Eight 8-ounce glasses of fluid daily (water, juices); avoid excessive caffeine intake. Meals/snacks as desired which are high in fiber and carbohydrates and low in fat and cholesterol. Physical Activity / Restrictions / Safety:     Avoid heavy lifting, no more that 8 lbs. For 2-3 weeks; limit use of stairs to 2 times daily for the first week home. No driving for one week. Avoid intercourse 4-6 weeks, no douching or tampon use. Check with obstetrician before starting or resuming an exercise program.       Discharge Instructions/Special Treatment/Home Care Needs:     Continue prenatal vitamins. Continue to use squirt bottle with warm water on your episiotomy after each bathroom use until bleeding stops. Call your doctor for the following:     Fever over 101 degrees by mouth. Vaginal bleeding heavier than a normal menstrual period or clot larger than a golf ball. Red streaks or increased swelling of legs, painful red streaks on your breast.  Painful urination, constipation and increased pain or swelling   If you feel extremely anxious or overwhelmed. If you have thoughts of harming yourself and/or your baby. Pain Management:     Pain Management:   Take Acetaminophen (Tylenol) or Ibuprofen (Advil, Motrin), as directed for pain. Use a warm Sitz bath 3 times daily to relieve episiotomy or hemorrhoidal discomfort. For hemorrhoidal discomfort, use Tucks and Anusol cream as needed and directed. Follow-Up Care:      These are general instructions for a healthy lifestyle:    No smoking/ No tobacco products/ Avoid exposure to second hand smoke    Surgeon General's Warning:  Quitting smoking now greatly reduces serious risk to your health. Obesity, smoking, and sedentary lifestyle greatly increases your risk for illness    A healthy diet, regular physical exercise & weight monitoring are important for maintaining a healthy lifestyle    Recognize signs and symptoms of STROKE:    F-face looks uneven    A-arms unable to move or move unevenly    S-speech slurred or non-existent    T-time-call 911 as soon as signs and symptoms begin-DO NOT go       Back to bed or wait to see if you get better-TIME IS BRAIN.

## 2021-05-29 NOTE — PROGRESS NOTES
0720 Bedside and Verbal shift change report given to MARGE June (oncoming nurse) by GLENDY Nagel RN (offgoing nurse). Report included the following information SBAR, Kardex, Procedure Summary, Intake/Output, MAR, Accordion, Recent Results and Med Rec Status. 0730 pt awake; no needs verbalized when asked    0805 VS/assessment done; see flowsheet. Motrin given for jamil discomfort    0955 pt sleeping; no distress    1130 pt sitting in bed; pumping; no needs verbalized    1215 pt sitting in bed; explained circ care of infant upon return to room; ID bands verified. 1345 No c//o verbalized when asked    026 848 14 90 no needs verbalized when asked; pt getting ready to go home. Discussed timing of discharge w/ pt    1515 I have reviewed discharge instructions with the patient. The patient verbalized understanding. AVS given and reviewed    1530 Patient armband removed and shredded. ID bands verified on pt and infant ; all info correct. 65 Pt in wheelchair; escorted to front door for discharge to home. No distress at this time.

## 2021-05-29 NOTE — PROGRESS NOTES
Bedside and Verbal shift change report given to MARGE Field (oncoming nurse) by GLENDY Nagel RN (offgoing nurse). Report included the following information SBAR, Kardex, Procedure Summary, Intake/Output, MAR, Accordion, Recent Results and Med Rec Status.

## 2021-05-29 NOTE — PROGRESS NOTES
Problem: Pain  Goal: *Control of Pain  Outcome: Progressing Towards Goal     Problem: Vaginal Delivery: Day of Deliver-Laboring  Goal: Medications  Outcome: Progressing Towards Goal  Goal: *Vital signs within defined limits  Outcome: Progressing Towards Goal  Goal: *Labs within defined limits  Outcome: Progressing Towards Goal  Goal: *Optimal pain control at patient's stated goal  Outcome: Progressing Towards Goal

## 2022-08-27 ENCOUNTER — ANESTHESIA (OUTPATIENT)
Dept: LABOR AND DELIVERY | Age: 22
DRG: 560 | End: 2022-08-27
Payer: MEDICAID

## 2022-08-27 ENCOUNTER — HOSPITAL ENCOUNTER (INPATIENT)
Age: 22
LOS: 1 days | Discharge: HOME | DRG: 560 | End: 2022-08-28
Attending: OBSTETRICS & GYNECOLOGY | Admitting: OBSTETRICS & GYNECOLOGY
Payer: MEDICAID

## 2022-08-27 ENCOUNTER — ANESTHESIA EVENT (OUTPATIENT)
Dept: LABOR AND DELIVERY | Age: 22
DRG: 560 | End: 2022-08-27
Payer: MEDICAID

## 2022-08-27 PROBLEM — Z33.1 IUP (INTRAUTERINE PREGNANCY), INCIDENTAL: Status: ACTIVE | Noted: 2022-08-27

## 2022-08-27 LAB
ABO + RH BLD: NORMAL
BASOPHILS # BLD: 0 K/UL (ref 0–0.1)
BASOPHILS NFR BLD: 0 % (ref 0–2)
BLOOD GROUP ANTIBODIES SERPL: NORMAL
DIFFERENTIAL METHOD BLD: ABNORMAL
EOSINOPHIL # BLD: 0 K/UL (ref 0–0.4)
EOSINOPHIL NFR BLD: 0 % (ref 0–5)
ERYTHROCYTE [DISTWIDTH] IN BLOOD BY AUTOMATED COUNT: 14.1 % (ref 11.6–14.5)
HCT VFR BLD AUTO: 30 % (ref 35–45)
HGB BLD-MCNC: 9.6 G/DL (ref 12–16)
IMM GRANULOCYTES # BLD AUTO: 0 K/UL (ref 0–0.04)
IMM GRANULOCYTES NFR BLD AUTO: 0 % (ref 0–0.5)
LYMPHOCYTES # BLD: 1.2 K/UL (ref 0.9–3.6)
LYMPHOCYTES NFR BLD: 13 % (ref 21–52)
MCH RBC QN AUTO: 27 PG (ref 24–34)
MCHC RBC AUTO-ENTMCNC: 32 G/DL (ref 31–37)
MCV RBC AUTO: 84.5 FL (ref 78–100)
MONOCYTES # BLD: 0.5 K/UL (ref 0.05–1.2)
MONOCYTES NFR BLD: 5 % (ref 3–10)
NEUTS SEG # BLD: 7.9 K/UL (ref 1.8–8)
NEUTS SEG NFR BLD: 82 % (ref 40–73)
NRBC # BLD: 0 K/UL (ref 0–0.01)
NRBC BLD-RTO: 0 PER 100 WBC
PLATELET # BLD AUTO: 246 K/UL (ref 135–420)
PMV BLD AUTO: 11 FL (ref 9.2–11.8)
RBC # BLD AUTO: 3.55 M/UL (ref 4.2–5.3)
SPECIMEN EXP DATE BLD: NORMAL
WBC # BLD AUTO: 9.7 K/UL (ref 4.6–13.2)

## 2022-08-27 PROCEDURE — 10H073Z INSERTION OF MONITORING ELECTRODE INTO PRODUCTS OF CONCEPTION, VIA NATURAL OR ARTIFICIAL OPENING: ICD-10-PCS | Performed by: OBSTETRICS & GYNECOLOGY

## 2022-08-27 PROCEDURE — 86900 BLOOD TYPING SEROLOGIC ABO: CPT

## 2022-08-27 PROCEDURE — 10907ZC DRAINAGE OF AMNIOTIC FLUID, THERAPEUTIC FROM PRODUCTS OF CONCEPTION, VIA NATURAL OR ARTIFICIAL OPENING: ICD-10-PCS | Performed by: OBSTETRICS & GYNECOLOGY

## 2022-08-27 PROCEDURE — 75410000002 HC LABOR FEE PER 1 HR

## 2022-08-27 PROCEDURE — 65270000029 HC RM PRIVATE

## 2022-08-27 PROCEDURE — 74011250636 HC RX REV CODE- 250/636

## 2022-08-27 PROCEDURE — 74011250636 HC RX REV CODE- 250/636: Performed by: NURSE ANESTHETIST, CERTIFIED REGISTERED

## 2022-08-27 PROCEDURE — 77030019905 HC CATH URETH INTMIT MDII -A

## 2022-08-27 PROCEDURE — 85025 COMPLETE CBC W/AUTO DIFF WBC: CPT

## 2022-08-27 PROCEDURE — 74011000250 HC RX REV CODE- 250: Performed by: NURSE ANESTHETIST, CERTIFIED REGISTERED

## 2022-08-27 PROCEDURE — 74011250637 HC RX REV CODE- 250/637: Performed by: ADVANCED PRACTICE MIDWIFE

## 2022-08-27 PROCEDURE — 00HU33Z INSERTION OF INFUSION DEVICE INTO SPINAL CANAL, PERCUTANEOUS APPROACH: ICD-10-PCS | Performed by: OBSTETRICS & GYNECOLOGY

## 2022-08-27 PROCEDURE — 74011000258 HC RX REV CODE- 258: Performed by: OBSTETRICS & GYNECOLOGY

## 2022-08-27 PROCEDURE — 75410000000 HC DELIVERY VAGINAL/SINGLE

## 2022-08-27 PROCEDURE — 74011250636 HC RX REV CODE- 250/636: Performed by: ADVANCED PRACTICE MIDWIFE

## 2022-08-27 PROCEDURE — 76060000078 HC EPIDURAL ANESTHESIA

## 2022-08-27 PROCEDURE — 77030007879 HC KT SPN EPDRL TELE -B: Performed by: NURSE ANESTHETIST, CERTIFIED REGISTERED

## 2022-08-27 PROCEDURE — 75410000003 HC RECOV DEL/VAG/CSECN EA 0.5 HR

## 2022-08-27 PROCEDURE — 77030040830 HC CATH URETH FOL MDII -A

## 2022-08-27 PROCEDURE — 74011000258 HC RX REV CODE- 258

## 2022-08-27 PROCEDURE — 74011250636 HC RX REV CODE- 250/636: Performed by: OBSTETRICS & GYNECOLOGY

## 2022-08-27 RX ORDER — NALBUPHINE HYDROCHLORIDE 10 MG/ML
2.5 INJECTION, SOLUTION INTRAMUSCULAR; INTRAVENOUS; SUBCUTANEOUS
Status: DISCONTINUED | OUTPATIENT
Start: 2022-08-27 | End: 2022-08-27 | Stop reason: HOSPADM

## 2022-08-27 RX ORDER — PHENYLEPHRINE HCL IN 0.9% NACL 1 MG/10 ML
80 SYRINGE (ML) INTRAVENOUS AS NEEDED
Status: COMPLETED | OUTPATIENT
Start: 2022-08-27 | End: 2022-08-27

## 2022-08-27 RX ORDER — SODIUM CHLORIDE, SODIUM LACTATE, POTASSIUM CHLORIDE, CALCIUM CHLORIDE 600; 310; 30; 20 MG/100ML; MG/100ML; MG/100ML; MG/100ML
125 INJECTION, SOLUTION INTRAVENOUS CONTINUOUS
Status: DISCONTINUED | OUTPATIENT
Start: 2022-08-27 | End: 2022-08-27 | Stop reason: HOSPADM

## 2022-08-27 RX ORDER — ROPIVACAINE IN 0.9% SOD CHL/PF 0.2 %
PLASTIC BAG, INJECTION (ML) EPIDURAL AS NEEDED
Status: DISCONTINUED | OUTPATIENT
Start: 2022-08-27 | End: 2022-08-27 | Stop reason: HOSPADM

## 2022-08-27 RX ORDER — SODIUM CHLORIDE 0.9 % (FLUSH) 0.9 %
5-40 SYRINGE (ML) INJECTION EVERY 8 HOURS
Status: DISCONTINUED | OUTPATIENT
Start: 2022-08-27 | End: 2022-08-27 | Stop reason: HOSPADM

## 2022-08-27 RX ORDER — ENOXAPARIN SODIUM 100 MG/ML
40 INJECTION SUBCUTANEOUS EVERY 24 HOURS
Status: DISCONTINUED | OUTPATIENT
Start: 2022-08-28 | End: 2022-08-28 | Stop reason: HOSPADM

## 2022-08-27 RX ORDER — LIDOCAINE HYDROCHLORIDE 10 MG/ML
20 INJECTION, SOLUTION EPIDURAL; INFILTRATION; INTRACAUDAL; PERINEURAL AS NEEDED
Status: DISCONTINUED | OUTPATIENT
Start: 2022-08-27 | End: 2022-08-27 | Stop reason: HOSPADM

## 2022-08-27 RX ORDER — BUTORPHANOL TARTRATE 2 MG/ML
2 INJECTION INTRAMUSCULAR; INTRAVENOUS
Status: DISCONTINUED | OUTPATIENT
Start: 2022-08-27 | End: 2022-08-27

## 2022-08-27 RX ORDER — OXYTOCIN/0.9 % SODIUM CHLORIDE 30/500 ML
0-20 PLASTIC BAG, INJECTION (ML) INTRAVENOUS
Status: DISCONTINUED | OUTPATIENT
Start: 2022-08-27 | End: 2022-08-28 | Stop reason: HOSPADM

## 2022-08-27 RX ORDER — TERBUTALINE SULFATE 1 MG/ML
0.25 INJECTION SUBCUTANEOUS
Status: DISCONTINUED | OUTPATIENT
Start: 2022-08-27 | End: 2022-08-27 | Stop reason: HOSPADM

## 2022-08-27 RX ORDER — BUTORPHANOL TARTRATE 2 MG/ML
2 INJECTION INTRAMUSCULAR; INTRAVENOUS
Status: DISCONTINUED | OUTPATIENT
Start: 2022-08-27 | End: 2022-08-27 | Stop reason: HOSPADM

## 2022-08-27 RX ORDER — OXYTOCIN/RINGER'S LACTATE 30/500 ML
10 PLASTIC BAG, INJECTION (ML) INTRAVENOUS AS NEEDED
Status: COMPLETED | OUTPATIENT
Start: 2022-08-27 | End: 2022-08-27

## 2022-08-27 RX ORDER — OXYCODONE AND ACETAMINOPHEN 5; 325 MG/1; MG/1
2 TABLET ORAL
Status: DISCONTINUED | OUTPATIENT
Start: 2022-08-27 | End: 2022-08-28 | Stop reason: HOSPADM

## 2022-08-27 RX ORDER — HYDROMORPHONE HYDROCHLORIDE 1 MG/ML
1 INJECTION, SOLUTION INTRAMUSCULAR; INTRAVENOUS; SUBCUTANEOUS
Status: DISCONTINUED | OUTPATIENT
Start: 2022-08-27 | End: 2022-08-27 | Stop reason: HOSPADM

## 2022-08-27 RX ORDER — EPHEDRINE SULFATE/0.9% NACL/PF 50 MG/5 ML
10 SYRINGE (ML) INTRAVENOUS AS NEEDED
Status: DISCONTINUED | OUTPATIENT
Start: 2022-08-27 | End: 2022-08-27 | Stop reason: HOSPADM

## 2022-08-27 RX ORDER — SODIUM CHLORIDE 0.9 % (FLUSH) 0.9 %
5-40 SYRINGE (ML) INJECTION AS NEEDED
Status: DISCONTINUED | OUTPATIENT
Start: 2022-08-27 | End: 2022-08-27 | Stop reason: HOSPADM

## 2022-08-27 RX ORDER — DIPHENHYDRAMINE HYDROCHLORIDE 50 MG/ML
25 INJECTION, SOLUTION INTRAMUSCULAR; INTRAVENOUS
Status: DISCONTINUED | OUTPATIENT
Start: 2022-08-27 | End: 2022-08-27 | Stop reason: HOSPADM

## 2022-08-27 RX ORDER — ONDANSETRON 2 MG/ML
4 INJECTION INTRAMUSCULAR; INTRAVENOUS
Status: DISCONTINUED | OUTPATIENT
Start: 2022-08-27 | End: 2022-08-27 | Stop reason: HOSPADM

## 2022-08-27 RX ORDER — IBUPROFEN 400 MG/1
800 TABLET ORAL EVERY 8 HOURS
Status: DISCONTINUED | OUTPATIENT
Start: 2022-08-27 | End: 2022-08-28 | Stop reason: HOSPADM

## 2022-08-27 RX ORDER — MINERAL OIL
30 OIL (ML) ORAL AS NEEDED
Status: DISCONTINUED | OUTPATIENT
Start: 2022-08-27 | End: 2022-08-27 | Stop reason: HOSPADM

## 2022-08-27 RX ORDER — METHYLERGONOVINE MALEATE 0.2 MG/ML
0.2 INJECTION INTRAVENOUS AS NEEDED
Status: DISCONTINUED | OUTPATIENT
Start: 2022-08-27 | End: 2022-08-27 | Stop reason: HOSPADM

## 2022-08-27 RX ORDER — PROMETHAZINE HYDROCHLORIDE 25 MG/ML
25 INJECTION, SOLUTION INTRAMUSCULAR; INTRAVENOUS
Status: DISCONTINUED | OUTPATIENT
Start: 2022-08-27 | End: 2022-08-28 | Stop reason: HOSPADM

## 2022-08-27 RX ORDER — NALOXONE HYDROCHLORIDE 0.4 MG/ML
0.2 INJECTION, SOLUTION INTRAMUSCULAR; INTRAVENOUS; SUBCUTANEOUS AS NEEDED
Status: DISCONTINUED | OUTPATIENT
Start: 2022-08-27 | End: 2022-08-27 | Stop reason: HOSPADM

## 2022-08-27 RX ORDER — FENTANYL/ROPIVACAINE/NS/PF 2MCG/ML-.1
PLASTIC BAG, INJECTION (ML) EPIDURAL
Status: DISCONTINUED
Start: 2022-08-27 | End: 2022-08-27 | Stop reason: WASHOUT

## 2022-08-27 RX ORDER — LIDOCAINE HYDROCHLORIDE AND EPINEPHRINE 15; 5 MG/ML; UG/ML
INJECTION, SOLUTION EPIDURAL AS NEEDED
Status: DISCONTINUED | OUTPATIENT
Start: 2022-08-27 | End: 2022-08-27 | Stop reason: HOSPADM

## 2022-08-27 RX ORDER — FENTANYL/ROPIVACAINE/NS/PF 2MCG/ML-.1
1-15 PLASTIC BAG, INJECTION (ML) EPIDURAL
Status: DISCONTINUED | OUTPATIENT
Start: 2022-08-27 | End: 2022-08-27 | Stop reason: HOSPADM

## 2022-08-27 RX ORDER — FENTANYL/ROPIVACAINE/NS/PF 2MCG/ML-.1
PLASTIC BAG, INJECTION (ML) EPIDURAL
Status: COMPLETED
Start: 2022-08-27 | End: 2022-08-27

## 2022-08-27 RX ORDER — LANOLIN ALCOHOL/MO/W.PET/CERES
3 CREAM (GRAM) TOPICAL
Status: DISCONTINUED | OUTPATIENT
Start: 2022-08-27 | End: 2022-08-28 | Stop reason: HOSPADM

## 2022-08-27 RX ORDER — DIPHENHYDRAMINE HYDROCHLORIDE 50 MG/ML
12.5 INJECTION, SOLUTION INTRAMUSCULAR; INTRAVENOUS ONCE
Status: COMPLETED | OUTPATIENT
Start: 2022-08-27 | End: 2022-08-27

## 2022-08-27 RX ORDER — AMOXICILLIN 250 MG
1 CAPSULE ORAL
Status: DISCONTINUED | OUTPATIENT
Start: 2022-08-27 | End: 2022-08-28 | Stop reason: HOSPADM

## 2022-08-27 RX ORDER — NALBUPHINE HYDROCHLORIDE 10 MG/ML
10 INJECTION, SOLUTION INTRAMUSCULAR; INTRAVENOUS; SUBCUTANEOUS
Status: DISCONTINUED | OUTPATIENT
Start: 2022-08-27 | End: 2022-08-27 | Stop reason: HOSPADM

## 2022-08-27 RX ORDER — ACETAMINOPHEN 325 MG/1
650 TABLET ORAL
Status: DISCONTINUED | OUTPATIENT
Start: 2022-08-27 | End: 2022-08-28 | Stop reason: HOSPADM

## 2022-08-27 RX ORDER — OXYTOCIN/0.9 % SODIUM CHLORIDE 30/500 ML
87.3 PLASTIC BAG, INJECTION (ML) INTRAVENOUS AS NEEDED
Status: DISCONTINUED | OUTPATIENT
Start: 2022-08-27 | End: 2022-08-27 | Stop reason: HOSPADM

## 2022-08-27 RX ADMIN — SODIUM CHLORIDE, POTASSIUM CHLORIDE, SODIUM LACTATE AND CALCIUM CHLORIDE 125 ML/HR: 600; 310; 30; 20 INJECTION, SOLUTION INTRAVENOUS at 14:19

## 2022-08-27 RX ADMIN — IBUPROFEN 800 MG: 400 TABLET, FILM COATED ORAL at 18:02

## 2022-08-27 RX ADMIN — Medication 80 MCG: at 15:20

## 2022-08-27 RX ADMIN — Medication 10000 MILLI-UNITS: at 15:27

## 2022-08-27 RX ADMIN — SODIUM CHLORIDE, POTASSIUM CHLORIDE, SODIUM LACTATE AND CALCIUM CHLORIDE 1000 ML: 600; 310; 30; 20 INJECTION, SOLUTION INTRAVENOUS at 08:01

## 2022-08-27 RX ADMIN — Medication 12 ML/HR: at 08:59

## 2022-08-27 RX ADMIN — SODIUM CHLORIDE, POTASSIUM CHLORIDE, SODIUM LACTATE AND CALCIUM CHLORIDE 1000 ML: 600; 310; 30; 20 INJECTION, SOLUTION INTRAVENOUS at 15:22

## 2022-08-27 RX ADMIN — SODIUM CHLORIDE, POTASSIUM CHLORIDE, SODIUM LACTATE AND CALCIUM CHLORIDE 125 ML/HR: 600; 310; 30; 20 INJECTION, SOLUTION INTRAVENOUS at 09:04

## 2022-08-27 RX ADMIN — Medication 5 ML: at 08:58

## 2022-08-27 RX ADMIN — SODIUM CHLORIDE 5 MILLION UNITS: 900 INJECTION INTRAVENOUS at 08:08

## 2022-08-27 RX ADMIN — LIDOCAINE HYDROCHLORIDE,EPINEPHRINE BITARTRATE 5 ML: 15; .005 INJECTION, SOLUTION EPIDURAL; INFILTRATION; INTRACAUDAL; PERINEURAL at 08:52

## 2022-08-27 RX ADMIN — ROPIVACAINE HYDROCHLORIDE 12 ML/HR: 5 INJECTION, SOLUTION EPIDURAL; INFILTRATION; PERINEURAL at 08:59

## 2022-08-27 RX ADMIN — Medication 2 MILLI-UNITS/MIN: at 14:45

## 2022-08-27 RX ADMIN — SODIUM CHLORIDE 2.5 MILLION UNITS: 9 INJECTION, SOLUTION INTRAVENOUS at 12:38

## 2022-08-27 RX ADMIN — DIPHENHYDRAMINE HYDROCHLORIDE 12.5 MG: 50 INJECTION INTRAMUSCULAR; INTRAVENOUS at 13:38

## 2022-08-27 RX ADMIN — SODIUM CHLORIDE, POTASSIUM CHLORIDE, SODIUM LACTATE AND CALCIUM CHLORIDE 125 ML/HR: 600; 310; 30; 20 INJECTION, SOLUTION INTRAVENOUS at 08:07

## 2022-08-27 NOTE — PROGRESS NOTES
6234 Bedside and Verbal shift change report given to NY French (oncoming nurse) by Robb Redman RN (offgoing nurse). Report included the following information SBAR, Kardex, Intake/Output, MAR, Recent Results, and Quality Measures. 615 [facility]. Grayson CRNA. Anesthesia at bedside    0838: inpostion for epidural    0839: epidural TO    0851: epiduralCatheter placed    4232: epidural Test Dose    0858: epidural loading Dose admin    0858: To left tilt    0926 Voided 150 cc on bedpan without difficulty. 5500 [facility] to bedside. SVE and AROM. 1515 Bedside and Verbal shift change report given to GLENDY Gifford RN (oncoming nurse) by Eve Bonner. Rhonda French (offgoing nurse). Report included the following information SBAR, Kardex, Procedure Summary, Intake/Output, MAR, Recent Results, and Quality Measures. CHELSIE Durbin and SEVEN Talavera to bedside for decel.

## 2022-08-27 NOTE — H&P
History & Physical    Name: Lily Patino MRN: [id number]  SSN: [id number]    YOB: 2000  Age: 24 y.o. Sex: female        Subjective:     Estimated Date of Delivery: 22  OB History    Para Term  AB Living   2 1 1     1   SAB IAB Ectopic Molar Multiple Live Births           0 1      # Outcome Date GA Lbr Lawrence/2nd Weight Sex Delivery Anes PTL Lv   2 Current            1 Term 21 40w0d 17:55 / 00:36 3.232 kg Kathrynn Leisure       Ms. Cale Guillen is admitted with pregnancy at 39w1d for contractions. Prenatal course was complicated by  a Hx of DVT in previous pregnancy, she never started her lovenox or heparin, and late to care with irregular PNVisits in 3rd trimester. Please see prenatal records for details. Past Medical History:   Diagnosis Date    Anemia     DVT (deep vein thrombosis) in pregnancy     Migraine      History reviewed. No pertinent surgical history. Social History     Occupational History    Not on file   Tobacco Use    Smoking status: Never    Smokeless tobacco: Never   Vaping Use    Vaping Use: Never used   Substance and Sexual Activity    Alcohol use: Not Currently    Drug use: Not Currently    Sexual activity: Not on file     History reviewed. No pertinent family history. No Known Allergies  Prior to Admission medications    Medication Sig Start Date End Date Taking? Authorizing Provider   PNV Comb #2-Iron-FA-Omega 3 29-1-400 mg cmpk Take 1 Tablet by mouth. Provider, Historical   heparin sodium,porcine (HEPARIN, PORCINE,) 10,000 Units by Does Not Apply route two (2) times a day. Patient not taking: Reported on 2022    Provider, Historical        Review of Systems: A comprehensive review of systems was negative except for that written in the HPI.     Objective:     Vitals:  Vitals:    22 0942 22 0946 22 0947 22 0952   BP:  111/78     Pulse:  75     Resp:       Temp:       SpO2: 100%  100% 100%   Weight:       Height: Physical Exam:  Patient without distress. Heart: Regular rate and rhythm  Lung: normal respiratory effort  Abdomen: soft, nontender  VE: 5-6/90/0, BOWI  Membranes:  Intact  Fetal Heart Rate: Reactive    Prenatal Labs:   Lab Results   Component Value Date/Time    ABO/Rh(D) A POSITIVE 2022 08:04 AM         Assessment/Plan:     Active Problems:    IUP (intrauterine pregnancy), incidental (2022)         Plan: Admit for labor.     Monitor labor progress  Epidural when desired  Pitocin augmentation if needed  PCN for GBS positive  Anticipate     Maureen Gauthier, MSN, APRN, CNM  2022

## 2022-08-27 NOTE — PROGRESS NOTES
Labor Progress Note  Patient seen, fetal heart rate and contraction pattern evaluated, patient examined. No data found. Physical Exam:  Cervical Exam:  5 (5-6)cm dilated    90% effaced    0 station    Presenting Part: cephalic  Membranes:  Artificial Rupture of Membranes;  Amniotic Fluid: small amount of clear fluid  Uterine Activity: Frequency: Every 7-8 minutes, Duration: 80 seconds, and Intensity: moderate  Fetal Heart Rate: Baseline: 130 per minute  Variability: moderate  Accelerations: yes  Decelerations: none  Uterine contractions: regular, every 7-8 minutes    Assessment/Plan:  Reassuring fetal status, Labor  Progressing normally, Continue plan for vaginal delivery

## 2022-08-27 NOTE — ANESTHESIA PROCEDURE NOTES
Epidural Block    Start time: 8/27/2022 8:33 AM  End time: 8/27/2022 8:58 AM  Reason for block: labor epidural  Staffing  Performed: CRNA   Resident/CRNA: Fredy Saucedo CRNA  Preanesthetic Checklist  Completed: patient identified, IV checked, site marked, risks and benefits discussed, surgical consent, monitors and equipment checked, pre-op evaluation, timeout performed and fire risk safety assessment completed and verbalized  Block Placement  Patient position: sitting  Prep: ChloraPrep  Sterility prep: cap, drape, mask and gloves  Sedation level: no sedation  Patient monitoring: frequent blood pressure checks and heart rate  Approach: midline  Epidural  Loss of resistance technique: saline  Guidance: landmark technique and ultrasound guided  Needle  Needle type: Tuohy   Needle gauge: 17 G  Needle length: 9 cm  Needle insertion depth: 4.5 cm  Catheter type: end hole  Catheter size: 19 G  Catheter at skin depth: 10 cm  Catheter securement method: clear occlusive dressing and surgical tape  Test dose: negative  Assessment  Number of attempts: 1  Procedure assessment: patient tolerated procedure well with no immediate complications

## 2022-08-27 NOTE — ANESTHESIA PREPROCEDURE EVALUATION
Relevant Problems   No relevant active problems       Anesthetic History   No history of anesthetic complications            Review of Systems / Medical History  Patient summary reviewed, nursing notes reviewed and pertinent labs reviewed    Pulmonary  Within defined limits                 Neuro/Psych   Within defined limits           Cardiovascular  Within defined limits                Exercise tolerance: >4 METS     GI/Hepatic/Renal  Within defined limits              Endo/Other        Anemia     Other Findings   Comments: H/o dvt           Physical Exam    Airway  Mallampati: III  TM Distance: 4 - 6 cm    Mouth opening: Normal     Cardiovascular  Regular rate and rhythm,  S1 and S2 normal,  no murmur, click, rub, or gallop  Rhythm: regular  Rate: normal         Dental  No notable dental hx       Pulmonary  Breath sounds clear to auscultation               Abdominal  Abdominal exam normal       Other Findings            Anesthetic Plan    ASA: 2  Anesthesia type: epidural            Anesthetic plan and risks discussed with: Patient

## 2022-08-27 NOTE — PROGRESS NOTES
OOB with assistance to BR and back to bed. While in BR, pt was instructed in how to do jamil care-pt performed own jamil care, fresh cold pack, fresh panties placed. Pt tolerated well, no weakness or dizziness noted until pt was getting back into bed and then she was unsteady and was assisted into bed

## 2022-08-27 NOTE — PROGRESS NOTES
1855- TRANSFER - IN REPORT:    Verbal report received from Lex(name) on [place]  being received from L/D(unit) for routine progression of care      Report consisted of patients Situation, Background, Assessment and   Recommendations(SBAR). Information from the following report(s) SBAR, Intake/Output, MAR, and Recent Results was reviewed with the receiving nurse. Opportunity for questions and clarification was provided. Pt breastfeeding at this time    1905- Bedside and Verbal shift change report given to Adal Salmon (oncoming nurse) by Nancy Kidd (offgoing nurse). Report included the following information SBAR, Intake/Output, MAR, and Recent Results.

## 2022-08-27 NOTE — PROGRESS NOTES
8562 Pt arrived to L&D with c/o ctx's q3 minutes. She is a  39w1d. EFM's placed on maternal abdomen. Cervix checked she is 7.7-8/100/0. Pt stated she is group B strep positive. 2215 Verbal report given to NY Peters RN, care assumed at this time.

## 2022-08-27 NOTE — PROGRESS NOTES
Pt c/o intermitt abd cramping at 1750- asked for and received po motrin at 1802 for pain rating of 2, pt's pain goal is 0/no pain.

## 2022-08-27 NOTE — PROGRESS NOTES
From BR 4 to postpartum rm 239 via wheelchair, accompanied by Rema Emerson RN.  Settled into new rm.,

## 2022-08-27 NOTE — L&D DELIVERY NOTE
Delivery Summary    Patient: Ayo Jauregui MRN: [id number]  SSN: [id number]    YOB: 2000  Age: 24 y.o. Sex: female       Information for the patient's :  Bipin Colon [[id number]]     Labor Events:    Labor:      Steroids:     Cervical Ripening Date/Time:       Cervical Ripening Type:     Antibiotics During Labor:     Rupture Identifier:      Rupture Date/Time: 2022 9:34 AM   Rupture Type: AROM   Amniotic Fluid Volume:      Amniotic Fluid Description: Clear    Amniotic Fluid Odor: None    Induction:         Induction Date/Time:        Indications for Induction:      Augmentation:     Augmentation Date/Time:      Indications for Augmentation:     Labor complications: Additional complications:        Delivery Events:  Indications For Episiotomy:     Episiotomy:     Perineal Laceration(s):     Repaired:     Periurethral Laceration Location:      Repaired:     Labial Laceration Location:     Repaired:     Sulcal Laceration Location:     Repaired:     Vaginal Laceration Location:     Repaired:     Cervical Laceration Location:     Repaired:     Repair Suture:     Number of Repair Packets:     Estimated Blood Loss (ml):  ml   Quantitative Blood Loss (ml)                Delivery Date: 2022    Delivery Time: 3:24 PM  Delivery Type: Vaginal, Spontaneous  Sex:  Male    Gestational Age: 36w3d   Delivery Clinician:  Clarke Primrose  Living Status: Living   Delivery Location: L&D LDR4          APGARS  One minute Five minutes Ten minutes   Skin color: 0   1        Heart rate: 2   2        Grimace: 2   2        Muscle tone: 2   2        Breathin   2        Totals: 8   9            Presentation: Vertex    Position: Left Occiput Anterior  Resuscitation Method:  Suctioning-bulb; Tactile Stimulation     Meconium Stained: None      Cord Information: 3 Vessels  Complications: Nuchal Cord With Compressions  Cord around: right lower extremity  Delayed cord clamping? Yes  Cord clamped date/time:2022  3:28 PM  Disposition of Cord Blood: Lab    Blood Gases Sent?: No    Placenta:  Date/Time:    Removal:        Appearance:       Carver Measurements:  Birth Weight: 3.08 kg      Birth Length: 52.1 cm      Head Circumference: 33.5 cm      Chest Circumference: 33 cm     Abdominal Girth: 30.5 cm    Other Providers:   ;KENTRELL FUENTES;ALEJO STROUD;;MARYJO BLEDSOE;;;;DIMAS VERNON;;Nidia GIPSON, Obstetrician;Primary Nurse;Primary  Nurse;Nicu Nurse;Neonatologist;Anesthesiologist;Crna;Nurse Practitioner;Midwife;Nursery Nurse;Midwife;Staff Nurse         Group B Strep: No results found for: GRBSEXT, GRBSEXT  Information for the patient's :  Bipin Colon [[id number]]   No results found for: ABORH, PCTABR, PCTDIG, BILI, ABORHEXT, ABORH   No results for input(s): PCO2CB, PO2CB, HCO3I, SO2I, IBD, PTEMPI, SPECTI, PHICB, ISITE, IDEV, IALLEN in the last 72 hours. Presented in early active labor. After epidural placement and pitocin infusion, infant with occasional variable decel with slow recovery, good variability. Infant with deep, prolonged variable, VE7/swollen anterior lip/+1. FSE placed to verify FHR. Position change did not resolve FHR. VE to reduce cervix and patient was found to be AL/c/+2. Infant delivered spontaneously over intact perineum in the JIMMY position. Cord wrapped around right infant foot, cord very thin and flat. Infant placed skin to skin, lusty cries noted with stimulation. Cord clamping delayed for 3min. Placenta delivered spontaneously. EBL: 200mls. Fundus firm and midline. Infant began grunting about 10min post birth, NICU called to bedside. Infant to NICU to transition. Patient stable.     Rosendo Diaz, MSN, APRN, CNM  2022

## 2022-08-28 VITALS
OXYGEN SATURATION: 98 % | BODY MASS INDEX: 28.85 KG/M2 | HEART RATE: 60 BPM | SYSTOLIC BLOOD PRESSURE: 103 MMHG | TEMPERATURE: 98 F | RESPIRATION RATE: 18 BRPM | HEIGHT: 64 IN | DIASTOLIC BLOOD PRESSURE: 43 MMHG | WEIGHT: 169 LBS

## 2022-08-28 LAB
HCT VFR BLD AUTO: 28.6 % (ref 35–45)
HGB BLD-MCNC: 8.9 G/DL (ref 12–16)

## 2022-08-28 PROCEDURE — 85018 HEMOGLOBIN: CPT

## 2022-08-28 PROCEDURE — 36415 COLL VENOUS BLD VENIPUNCTURE: CPT

## 2022-08-28 PROCEDURE — 74011250637 HC RX REV CODE- 250/637: Performed by: ADVANCED PRACTICE MIDWIFE

## 2022-08-28 PROCEDURE — 74011250636 HC RX REV CODE- 250/636: Performed by: ADVANCED PRACTICE MIDWIFE

## 2022-08-28 RX ORDER — IBUPROFEN 200 MG
800 CAPSULE ORAL
Qty: 360 CAPSULE | Refills: 0 | Status: SHIPPED | OUTPATIENT
Start: 2022-08-28

## 2022-08-28 RX ORDER — LANOLIN ALCOHOL/MO/W.PET/CERES
325 CREAM (GRAM) TOPICAL
Qty: 60 TABLET | Refills: 0 | Status: SHIPPED | OUTPATIENT
Start: 2022-08-28

## 2022-08-28 RX ADMIN — IBUPROFEN 800 MG: 400 TABLET, FILM COATED ORAL at 01:53

## 2022-08-28 RX ADMIN — ENOXAPARIN SODIUM 40 MG: 100 INJECTION SUBCUTANEOUS at 05:14

## 2022-08-28 RX ADMIN — IBUPROFEN 800 MG: 400 TABLET, FILM COATED ORAL at 09:41

## 2022-08-28 NOTE — PROGRESS NOTES
Problem: Pain  Goal: *Control of Pain  Outcome: Progressing Towards Goal     Problem: Vaginal Delivery: Day of Delivery-Post delivery  Goal: Activity/Safety  Outcome: Progressing Towards Goal  Goal: Discharge Planning  Outcome: Progressing Towards Goal  Goal: Medications  Outcome: Progressing Towards Goal  Goal: Treatments/Interventions/Procedures  Outcome: Progressing Towards Goal  Goal: *Vital signs within defined limits  Outcome: Progressing Towards Goal  Goal: *Labs within defined limits  Outcome: Progressing Towards Goal  Goal: *Hemodynamically stable  Outcome: Progressing Towards Goal  Goal: *Optimal pain control at patient's stated goal  Outcome: Progressing Towards Goal  Goal: *Participates in infant care  Outcome: Progressing Towards Goal  Goal: *Demonstrates progressive activity  Outcome: Progressing Towards Goal

## 2022-08-28 NOTE — DISCHARGE SUMMARY
Obstetrical Discharge Summary     Name: Arlin Sosa MRN: [id number]  SSN: [id number]    YOB: 2000  Age: 24 y.o. Sex: female      Allergies: Patient has no known allergies. Admit Date: 2022    Discharge Date: 2022     Admitting Physician: Lisa Mae MD     Attending Physician:  Aakash Hinson MD     * Admission Diagnoses: IUP (intrauterine pregnancy), incidental [Z33.1]    * Discharge Diagnoses:   Information for the patient's :  Suyapa Haines [[id number]]   Delivery of a 3.08 kg male infant via Vaginal, Spontaneous on 2022 at 3:24 PM  by Frederic Ann. Apgars were 8  and 9 . Additional Diagnoses:   Hospital Problems as of 2022 Date Reviewed: 2021            Codes Class Noted - Resolved POA    IUP (intrauterine pregnancy), incidental ICD-10-CM: Z33.1  ICD-9-CM: V22.2  2022 - Present Unknown          Lab Results   Component Value Date/Time    ABO/Rh(D) A POSITIVE 2022 08:04 AM    There is no immunization history for the selected administration types on file for this patient. * Procedures:              * Discharge Condition: good    * Hospital Course: Normal hospital course following the delivery. * Disposition: Home    Discharge Medications:   Current Discharge Medication List        START taking these medications    Details   ibuprofen 200 mg cap Take 800 mg by mouth every eight (8) hours as needed for Pain. Qty: 360 Capsule, Refills: 0  Start date: 2022      ferrous sulfate (Iron) 325 mg (65 mg iron) tablet Take 1 Tablet by mouth Daily (before breakfast). Qty: 60 Tablet, Refills: 0  Start date: 2022           CONTINUE these medications which have NOT CHANGED    Details   PNV Comb #2-Iron-FA-Omega 3 29-1-400 mg cmpk Take 1 Tablet by mouth.            STOP taking these medications       heparin sodium,porcine (HEPARIN, PORCINE,) Comments:   Reason for Stopping:               * Follow-up Care/Patient Instructions:   Activity: Activity as tolerated  Diet: Regular Diet  Wound Care: None needed    Follow-up Information       Follow up With Specialties Details Why Contact Info    Juan Potter MD [facility]   [place] [id number]  [address] [place] [address], MSN, APRN, CNM  August 28, 2022

## 2022-08-28 NOTE — PROGRESS NOTES
0730 Bedside and Verbal shift change report given to Maco Cervantes RN (oncoming nurse) by Geraldo Ch RN (offgoing nurse). Report included the following information SBAR, Kardex, Intake/Output, MAR, and Recent Results. Pt educated on pain management, pt denies any pain or needs at this time. 0940 Assessment completed, scheduled IBU administered, pt denies any pain or needs at this time. 1158 Pt denies any pain or needs at this time. 1443 Reassessment completed, WDL. 1840 AVS and discharge teachings reviewed and e-signed, arm  bands verified and matching, arm bands verified and matching, pt discharged home in stable condition.

## 2022-08-28 NOTE — DISCHARGE INSTRUCTIONS
Postpartum: Care Instructions  Overview  After childbirth (postpartum period), your body goes through many changes. Some of these changes happen over several weeks. In the hours after delivery, your body will begin to recover from childbirth while it prepares to breastfeed your . You may feel emotional during this time. Your hormones can shift your mood without warning for no clear reason. In the first couple of weeks after childbirth, it's common to have emotions that change from happy to sad. You may find it hard to sleep. You may cry a lot. This is called the \"baby blues. \" These overwhelming emotions often go away within a couple of days or weeks. But it's important to discuss your feelings with your doctor. It's easy to get too tired and overwhelmed during the first weeks after childbirth. Don't try to do too much. Get rest whenever you can, accept help from others, and eat well and drink plenty of fluids. In the first couple of weeks after you give birth, your doctor or midwife may want to check in with you and make a plan for any follow-up care you may need. You will likely have a complete postpartum visit in the first 3 months after delivery. At that time, your doctor or midwife will check on your recovery from childbirth and see how you're doing with your emotions. You may also discuss your concerns or questions. Follow-up care is a key part of your treatment and safety. Be sure to make and go to all appointments, and call your doctor if you are having problems. It's also a good idea to know your test results and keep a list of the medicines you take. How can you care for yourself at home? Sleep or rest when your baby sleeps. Get help with household chores from family or friends, if you can. Don't try to do it all yourself. If you have hemorrhoids or swelling or pain around the opening of your vagina, try using cold and heat.  You can put ice or a cold pack on the area for 10 to 20 minutes at a time. Put a thin cloth between the ice and your skin. Also try sitting in a few inches of warm water (sitz bath) 3 times a day and after bowel movements. Take pain medicines exactly as directed. If the doctor gave you a prescription medicine for pain, take it as prescribed. If you are not taking a prescription pain medicine, ask your doctor if you can take an over-the-counter medicine. Eat more fiber to avoid constipation. Include foods such as whole-grain breads and cereals, raw vegetables, raw and dried fruits, and beans. Drink plenty of fluids. If you have kidney, heart, or liver disease and have to limit fluids, talk with your doctor before you increase the amount of fluids you drink. Do not rinse inside your vagina with fluids (douche). If you have stitches, keep the area clean by pouring or spraying warm water over the area outside your vagina and anus after you use the toilet. Keep a list of questions to ask your doctor or midwife. Your questions might be about:  Changes in your breasts, such as lumps or soreness. When to expect your menstrual period to start again. What form of birth control is best for you. Weight you have put on during the pregnancy. Exercise options. What foods and drinks are best for you, especially if you are breastfeeding. Problems you might be having with breastfeeding. When you can have sex. You may want to talk about lubricants for your vagina. Any feelings of sadness or restlessness that you are having. When should you call for help? Share this information with your partner, family, or a friend. They can help you watch for warning signs. Call 911  anytime you think you may need emergency care. For example, call if:    You have thoughts of harming yourself, your baby, or another person. You passed out (lost consciousness). You have chest pain, are short of breath, or cough up blood. You have a seizure.    Call your doctor now or seek immediate medical care if:    You have signs of hemorrhage (too much bleeding), such as:  Heavy vaginal bleeding. This means that you are soaking through one or more pads in an hour. Or you pass blood clots bigger than an egg. Feeling dizzy or lightheaded, or you feel like you may faint. Feeling so tired or weak that you cannot do your usual activities. A fast or irregular heartbeat. New or worse belly pain. You have signs of infection, such as:  A fever. Vaginal discharge that smells bad. New or worse belly pain. You have symptoms of a blood clot in your leg (called a deep vein thrombosis), such as:  Pain in the calf, back of the knee, thigh, or groin. Redness and swelling in your leg or groin. You have signs of preeclampsia, such as:  Sudden swelling of your face, hands, or feet. New vision problems (such as dimness, blurring, or seeing spots). A severe headache. Watch closely for changes in your health, and be sure to contact your doctor if:    Your vaginal bleeding isn't decreasing. You feel sad, anxious, or hopeless for more than a few days. You are having problems with your breasts or breastfeeding. Where can you learn more? Go to http://www.gray.com/  Enter U627 in the search box to learn more about \"Postpartum: Care Instructions. \"  Current as of: June 16, 2021               Content Version: 13.2  © 9808-9995 [employer]. Care instructions adapted under license by [employer] (which disclaims liability or warranty for this information). If you have questions about a medical condition or this instruction, always ask your healthcare professional. [place] [address] any warranty or liability for your use of this information.

## 2022-08-28 NOTE — PROGRESS NOTES
1920 Bedside shift change report given to Valerie Villalta RN and Coty Garcia RN (oncoming nurse) by Ashwin Mayes RN (offgoing nurse). Report included the following information SBAR, Intake/Output, MAR, and Recent Results. 2050 Per pt, she has voided for 2nd time. 2115 Pt feeding infant. 2155 Pt vitals assessed. Pt. joined at bedside by baby. AAOx4. Pain 0/10. Fundus firm at U-1, scant rubra lochia. No clots noted. Educated on signs and symptoms to report and plan of care. No further questions or concerns at this time. Callbell within reach. Bed in lowest position. 2310 Pt resting with call bell in reach. 0153 Pt administered 2 tabs motrin. 0246 Pt pain reassessed and pt given blanket as requested. 0405 Pt vitals assessed. Fundus firm at U with scant rubra lochia.    0514 Pt administered 40 mg lovenox by Coty Garcia RN.     9929 Pt sleeping with infant in bed. Infant placed supine in bassinet. 0730 Bedside shift change report given to Francisco Tan RN (oncoming nurse) by Valerie Villalta RN (offgoing nurse). Report included the following information SBAR, Intake/Output, MAR, and Recent Results.

## 2022-08-28 NOTE — PROGRESS NOTES
Progress Note    Patient: Emily Lemons MRN: [id number]     YOB: 2000  Age: 24 y.o. Subjective:     Postpartum Day: 1    The patient is feeling well. Pain is  well controlled with current medications. Urinary output is adequate. Baby is feeding via breast without difficulty. Objective:      Patient Vitals for the past 12 hrs:   Temp Pulse Resp BP SpO2   08/28/22 0715 98 °F (36.7 °C) 60 18 (!) 103/43 98 %   08/28/22 0405 97.9 °F (36.6 °C) (!) 59 16 (!) 113/59 100 %       General:    alert, cooperative, no distress   Lochia:  appropriate   Uterine Fundus:   firm @ umbilicus    Perineum:  well-approximated   DVT Evaluation:  No evidence of DVT seen on physical exam.     Lab/Data Review:  Recent Results (from the past 24 hour(s))   HGB & HCT    Collection Time: 08/28/22  5:43 AM   Result Value Ref Range    HGB 8.9 (L) 12.0 - 16.0 g/dL    HCT 28.6 (L) 35.0 - 45.0 %     All lab results for the last 24 hours reviewed. Assessment:     Delivery: spontaneous vaginal delivery    Plan:     Doing well postpartum vaginal delivery. Continue current postpartum care. Encouraged hydration, nutrition and ambulation. Plan DC home today.     Signed By: Maggie Worrell CNM     August 28, 2022

## 2023-07-29 NOTE — PROGRESS NOTES
0720 Bedside and Verbal shift change report given to Rocky Shelby RN (oncoming nurse) by Kash Cunningham RN (offgoing nurse). Report included the following information SBAR, Kardex and MAR.   0911 Oxytocin halved for tachysystole  [address] [facility] notified to have Dr. Mateusz Del Castillo call at his convenience (not urgent)  1027 Dr. Mateusz Del Castillo notified of halving oxytocin administration earlier due to tachysystole. 1136 Patient repositioned to birthing ball    [phone] 14 90 Patient requesting epidural, IV bolus begun  1509 Dr. Isacc Dior paged first time, states will be coming for epidural placement  1532 Dr. Isacc Dior paged for 2nd time, states will be here in 5-10 minutes  1539 Dr. Isacc Dior at bedside explaining epidural procedure, consent is signed. Fentanyl handed to Dr. Isacc Dior. 1543 Time out performed  1546 Procedure begins  1546 Epidural needle inserted  1549 Epidural catheter threaded into place  1550 Epidural needle removed  1551 Test dose administered by Dr. Isacc Dior  1552 Fentanyl administered by Dr. Isacc Dior  1553 Taping epidural catheter in place. Procedure is complete    1714 O2 removed  6665 Oxytocin restarted as ordered by Dr. Mateusz Del Castillo (he reviewed EFM  Tracing)  1855 SVE 10/100/+2  1859 Dr. Mateusz Del Castillo states he is on his way  1912 Bedside and Verbal shift change report given to Lucy Simmons RN (oncoming nurse) by Rocky Shelby RN (offgoing nurse). Report included the following information SBAR, Kardex, Intake/Output, MAR and Recent Results. 16

## 2024-09-23 NOTE — PROGRESS NOTES
Problem: Pain  Goal: *Control of Pain  Outcome: Progressing Towards Goal     Problem: Vaginal Delivery: Postpartum Day 1  Goal: Activity/Safety  Outcome: Progressing Towards Goal  Goal: Consults, if ordered  Outcome: Progressing Towards Goal  Goal: Diagnostic Test/Procedures  Outcome: Progressing Towards Goal  Goal: Nutrition/Diet  Outcome: Progressing Towards Goal  Goal: Discharge Planning  Outcome: Progressing Towards Goal  Goal: Medications  Outcome: Progressing Towards Goal  Goal: Treatments/Interventions/Procedures  Outcome: Progressing Towards Goal  Goal: Psychosocial  Outcome: Progressing Towards Goal  Goal: *Vital signs within defined limits  Outcome: Progressing Towards Goal  Goal: *Labs within defined limits  Outcome: Progressing Towards Goal  Goal: *Hemodynamically stable  Outcome: Progressing Towards Goal  Goal: *Optimal pain control at patient's stated goal  Outcome: Progressing Towards Goal  Goal: *Participates in infant care  Outcome: Progressing Towards Goal  Goal: *Demonstrates progressive activity  Outcome: Progressing Towards Goal  Goal: *Performs self perineal care  Outcome: Progressing Towards Goal  Goal: *Appropriate parent-infant bonding  Outcome: Progressing Towards Goal  Goal: *Tolerating diet  Outcome: Progressing Towards Goal  Goal: *Performs self breast care  Outcome: Progressing Towards Goal [FreeTextEntry1] : #Hyperpigmentation, lower mucosal lip new problem, uncertain prognosis - ddx includes irritant dermatitis with PIH vs. lichen planus. Discussed biopsy vs. treatment trial - pt opts for tx trial - START tacrolimus 0.1% ointment to affected areas of lip twice daily - SED of tingling/burning upon application - C/w SPF moisturizer to lips daily  - Con consider biopsy at next visit if not improving  RTC 1 month (4) no impairment